# Patient Record
Sex: FEMALE | Race: WHITE | NOT HISPANIC OR LATINO
[De-identification: names, ages, dates, MRNs, and addresses within clinical notes are randomized per-mention and may not be internally consistent; named-entity substitution may affect disease eponyms.]

---

## 2017-09-01 ENCOUNTER — HEALTH MAINTENANCE LETTER (OUTPATIENT)
Age: 47
End: 2017-09-01

## 2017-11-24 ENCOUNTER — HEALTH MAINTENANCE LETTER (OUTPATIENT)
Age: 47
End: 2017-11-24

## 2017-11-24 ENCOUNTER — OFFICE VISIT (OUTPATIENT)
Dept: FAMILY MEDICINE | Facility: CLINIC | Age: 47
End: 2017-11-24

## 2017-11-24 VITALS
HEIGHT: 67 IN | BODY MASS INDEX: 36.35 KG/M2 | HEART RATE: 76 BPM | TEMPERATURE: 97.7 F | OXYGEN SATURATION: 98 % | SYSTOLIC BLOOD PRESSURE: 128 MMHG | DIASTOLIC BLOOD PRESSURE: 82 MMHG | WEIGHT: 231.6 LBS

## 2017-11-24 DIAGNOSIS — Z13.220 SCREENING FOR LIPID DISORDERS: ICD-10-CM

## 2017-11-24 DIAGNOSIS — Z13.228 SCREENING FOR METABOLIC DISORDER: ICD-10-CM

## 2017-11-24 DIAGNOSIS — E66.812 CLASS 2 OBESITY WITHOUT SERIOUS COMORBIDITY WITH BODY MASS INDEX (BMI) OF 36.0 TO 36.9 IN ADULT, UNSPECIFIED OBESITY TYPE: ICD-10-CM

## 2017-11-24 DIAGNOSIS — Z01.419 ENCOUNTER FOR GYNECOLOGICAL EXAMINATION WITHOUT ABNORMAL FINDING: Primary | ICD-10-CM

## 2017-11-24 DIAGNOSIS — Z12.4 SCREENING FOR CERVICAL CANCER: ICD-10-CM

## 2017-11-24 PROCEDURE — 80053 COMPREHEN METABOLIC PANEL: CPT | Mod: 90 | Performed by: PHYSICIAN ASSISTANT

## 2017-11-24 PROCEDURE — 36415 COLL VENOUS BLD VENIPUNCTURE: CPT | Performed by: PHYSICIAN ASSISTANT

## 2017-11-24 PROCEDURE — 99396 PREV VISIT EST AGE 40-64: CPT | Performed by: PHYSICIAN ASSISTANT

## 2017-11-24 PROCEDURE — 80061 LIPID PANEL: CPT | Mod: 90 | Performed by: PHYSICIAN ASSISTANT

## 2017-11-24 PROCEDURE — 87624 HPV HI-RISK TYP POOLED RSLT: CPT | Mod: 90 | Performed by: PHYSICIAN ASSISTANT

## 2017-11-24 PROCEDURE — 88142 CYTOPATH C/V THIN LAYER: CPT | Mod: 90 | Performed by: PHYSICIAN ASSISTANT

## 2017-11-24 NOTE — PROGRESS NOTES
Chief Complaint:  Physical Exam    SUBJECTIVE:   Klarissa Zimmerman is a 47 year old female presents for routine health maintenance.    Current concerns: Concerns about menopause. Had a period 2 weeks apart. This has never happened before. Has also noticed decreasing from 28 days to 26 day    Menses are irregular 2 weeks apart x 1. .     Patient's last menstrual period was 11/09/2017.    Was last Pap smear normal: Yes  Due for mammogram:  Yes    Body mass index is 36.55 kg/(m^2).    Present exercise habits:  No formal exercise. Doesn't have time. A lot of stress with family.   Present dietary habits:  eats foods high in fat and eats regular meals    Calcium intake:  2-3 servings per day. Takes supplement  Vit D intake: is taking supplement    Is the patient a smoker? No  If yes, smoking cessation advised and counseling provided.     Cardiovascular risk factors: obesity    Over the past few weeks, have you felt down or depressed? Little interest or pleasure in doing things? NO    If in a relationship are there any Domestic violence concern: No    Last dental appointment:  this year  Last optical appointment:  this year    Was the patient born between 9[birth year] and has not had Hep C testing?  No, not applicable    I have reviewed the following histories: Past Medical History, Past Surgical History, Social History, Family History, Problem List, Medication List and Allergies    Past Medical History:   Diagnosis Date     Mixed hyperlipidemia      Family History   Problem Relation Age of Onset     Hypertension Mother      Breast Cancer Mother 60     CANCER Mother 73     uterine, carcinosarcoma     Depression Mother      CANCER Father      prostate     Depression Sister      HEART DISEASE Maternal Grandmother      CHF at 90     DIABETES Other      1st cousin     CANCER Paternal Grandfather      prostate     DIABETES Maternal Aunt      Cancer - colorectal No family hx of      Social History     Social History     Marital  status:      Spouse name: Erick     Number of children: 2     Years of education: 16     Occupational History     Teacher      [employer]      [address]     Social History Main Topics     Smoking status: Never Smoker     Smokeless tobacco: Never Used     Alcohol use 0.0 oz/week     0 drink(s) per week      Comment: 1 per month     Drug use: No     Sexual activity: Yes     Partners: Male      Comment: tubal ligation     Other Topics Concern     Exercise Yes     Seat Belt Yes     Self-Exams Yes     Social History Narrative    ABUSE HISTORY:        History of abusive relationships in past:    No    History of abusive relationships currently:    No    Feels safe in home and work envirmt.                   Yes         Past Surgical History:   Procedure Laterality Date     C  DELIVERY ONLY  03    , Low Cervical     C  DELIVERY ONLY      , Low Cervical     C LIGATE FALLOPIAN TUBE,POSTPARTUM      Tubal Ligation     HC LAPAROSCOPY, SURGICAL; CHOLECYSTECTOMY      Cholecystectomy, Laparoscopic       ROS:  E/M: NEGATIVE for ear, nose, mouth and throat problems  R: NEGATIVE for significant/chronic cough or SOB  CV: NEGATIVE for chest pain or palpitations  GI: NEGATIVE for abdominal pain, chronic diarrhea or constipation  :  NEGATIVE for dysuria, hematuria or vaginal discharge. No sexual health concerns.       Current Outpatient Prescriptions   Medication     Omega-3 Fatty Acids (FISH OIL) 1200 MG capsule     MULTI-VITAMIN OR TABS     CALCIUM + D OR     No current facility-administered medications for this visit.        Patient Active Problem List    Diagnosis Date Noted     Health Care Home 10/11/2013     Priority: Medium     State Tier Level:  Tier 1  Status:  n/a  Care Coordinator:  n/a     See Letters for [facility] Care Plan           ACP (advance care planning) 2011     Priority: Medium     Advance Care Planning:   ACP Review and Resources Provided:   "Reviewed chart for advance care plan.  Klarissa Zimmerman has no plan or code status on file. Discussed available resources and provided with information. Confirmed code status reflects current choices pending further ACP discussions.  Confirmed/documented designated decision maker(s). See permanent comments section of demographics in clinical tab.   Added by Melba Taylor on 2/28/2014             Mixed hyperlipidemia      Priority: Medium         OBJECTIVE:  /82 (BP Location: Right arm, Patient Position: Chair, Cuff Size: Adult Large)  Pulse 76  Temp 97.7  F (36.5  C) (Oral)  Ht 1.695 m (5' 6.75\")  Wt 105.1 kg (231 lb 9.6 oz)  LMP 11/09/2017  SpO2 98%  Breastfeeding? No  BMI 36.55 kg/m2        General: 47 year old female who appears her stated age. Vital signs noted  Head: Normocephalic  Eyes: pupils equal round reactive to light and accomodation, extra ocular movements intact  Ears: external canals and TMs free of abnormalities  Nose: patent, without mucosal abnormalities  Mouth and throat: without erythema or lesions of the mucosa  Neck: supple, without adenopathy or thyromegaly  Lungs: clear to auscultation, no wheezing or crackles  Breasts: skin without rash, no dominant mass, no nipple discharge, or axillary adenopathy  Cv: regular rate and rhythm, normal s1 and s2 without murmur or click  Abd: soft, non-tender, no masses, no hepatomegaly or splenomegaly.   (female): normal female external genitalia, normal urethral meatus, vaginal mucosa, normal cervix/adnexa/uterus without masses or discharge  Ms: normal muscle tone & symmetry  Skin: clear to inspection and with no palpable abnormalities.  Neuro: sensation and motor function grossly intact; cranial nerves without obvious abnormalities.    ASSESSMENT/PLAN:    1. Encounter for gynecological examination without abnormal finding  Klarissa is doing well. Did recommend a mammogram. Will check fasting labs today, and contact her via [employer] when " "available. Did spend time discussing weight management, and need for positive exercise, and even some minor diet changes.     2. Screening for cervical cancer  - ThinPrep Pap and HPV mRna E6/E7 ([facility])    3. Screening for lipid disorders  - VENOUS COLLECTION  - Lipid Profile ([facility])    4. Screening for metabolic disorder  - VENOUS COLLECTION  - Comprehensive metabolic panel    5. Class 2 obesity without serious comorbidity with body mass index (BMI) of 36.0 to 36.9 in adult, unspecified obesity type       reports that she has never smoked. She has never used smokeless tobacco.      Estimated body mass index is 36.55 kg/(m^2) as calculated from the following:    Height as of this encounter: 1.695 m (5' 6.75\").    Weight as of this encounter: 105.1 kg (231 lb 9.6 oz).  Weight management plan: Discussed healthy diet and exercise guidelines and patient will follow up in 12 months in clinic to re-evaluate.      Labs pending:      Fasting glucose      Fasting lipids       Pap smear  Meds Suggested:      Vitamin D       Calcium  Tests Recommended:      Regular Dental Examinations        Eye exam        Mammogram yearly  Behavior Modifications:       Cardiovascular exercise 3 times per week--enough to get your Target Heart rate  Other recommendations:     BMI noted and discussed      Regular breast exam     Encouraged My Chart    Counseling Resources:  ATP IV Guidelines  Pooled Cohorts Equation Calculator  Breast Cancer Risk Calculator  FRAX Risk Assessment  ICSI Preventive Guidelines  Dietary Guidelines for Americans, 2010  USDA's MyPlate            Yoselin Andrews PA-C  11/24/2017    "

## 2017-11-24 NOTE — NURSING NOTE
Klarissa is here for a CPX pt is fasting    Patient is here for a full physical exam.    Pre-Visit Screening :  Immunizations : not up to date - postponed flu  Colon Screening : is up to date  Mammogram: up to date  Asthma Action Test/Plan : NA  PHQ9/GAD7 :  phq2      Vitals:  Pulse - regular  My Chart - accepts    CLASSIFICATION OF OVERWEIGHT AND OBESITY BY BMI                         Obesity Class           BMI(kg/m2)  Underweight                                    < 18.5  Normal                                         18.5-24.9  Overweight                                     25.0-29.9  OBESITY                     I                  30.0-34.9                              II                 35.0-39.9  EXTREME OBESITY             III                >40                             Patient's  BMI There is no height or weight on file to calculate BMI.  http://hin.nhlbi.nih.gov/menuplanner/menu.cgi  Questioned patient about current smoking habits.  Pt. has never smoked.    ETOH screening:  Questions:  1-How often do you have a drink containing alcohol?                             2 times per month(s)  2-How many drinks containing alcohol do you have on a typical day when you are         Drinking?                              1   3- How often do you have 5 or more drinks on one occasion?                              0 per month    Have you ever:  None of the patient's responses to the CAGE screening were positive / Negative CAGE score         The patient has verbalized that it is ok to leave a detailed voice message on the patient's home voicemail with results/recommendations from this visit.

## 2017-11-25 LAB
ALBUMIN SERPL-MCNC: 4 G/DL (ref 3.6–5.1)
ALBUMIN/GLOB SERPL: 1.3 (CALC) (ref 1–2.5)
ALP SERPL-CCNC: 90 U/L (ref 33–115)
ALT SERPL-CCNC: 21 U/L (ref 6–29)
AST SERPL-CCNC: 16 U/L (ref 10–35)
BILIRUB SERPL-MCNC: 0.5 MG/DL (ref 0.2–1.2)
BUN SERPL-MCNC: 13 MG/DL (ref 7–25)
BUN/CREATININE RATIO: ABNORMAL (CALC) (ref 6–22)
CALCIUM SERPL-MCNC: 9.1 MG/DL (ref 8.6–10.2)
CHLORIDE SERPLBLD-SCNC: 107 MMOL/L (ref 98–110)
CHOLEST SERPL-MCNC: 238 MG/DL
CHOLEST/HDLC SERPL: 5.2 (CALC)
CO2 SERPL-SCNC: 19 MMOL/L (ref 20–31)
CREAT SERPL-MCNC: 0.6 MG/DL (ref 0.5–1.1)
EGFR AFRICAN AMERICAN - QUEST: 126 ML/MIN/1.73M2
GFR SERPL CREATININE-BSD FRML MDRD: 109 ML/MIN/1.73M2
GLOBULIN, CALCULATED - QUEST: 3 G/DL (CALC) (ref 1.9–3.7)
GLUCOSE - QUEST: 90 MG/DL (ref 65–99)
HDLC SERPL-MCNC: 46 MG/DL
LDLC SERPL CALC-MCNC: 162 MG/DL (CALC)
NONHDLC SERPL-MCNC: 192 MG/DL (CALC)
POTASSIUM SERPL-SCNC: 4.1 MMOL/L (ref 3.5–5.3)
PROT SERPL-MCNC: 7 G/DL (ref 6.1–8.1)
SODIUM SERPL-SCNC: 139 MMOL/L (ref 135–146)
TRIGL SERPL-MCNC: 153 MG/DL

## 2017-11-30 LAB
CLINICAL HISTORY - QUEST: NORMAL
CYTOTECHNOLOGIST - QUEST: NORMAL
DESCRIPTIVE DIAGNOSIS - QUEST: NORMAL
HPV MRNA E6/E7: NOT DETECTED
LAST PAP DX - QUEST: NORMAL
LMP - QUEST: NORMAL
PREV BX DX - QUEST: NORMAL
SOURCE: NORMAL
STATEMENT OF ADEQUACY - QUEST: NORMAL

## 2018-04-05 ENCOUNTER — OFFICE VISIT (OUTPATIENT)
Dept: FAMILY MEDICINE | Facility: CLINIC | Age: 48
End: 2018-04-05

## 2018-04-05 VITALS
DIASTOLIC BLOOD PRESSURE: 75 MMHG | WEIGHT: 234.6 LBS | SYSTOLIC BLOOD PRESSURE: 112 MMHG | OXYGEN SATURATION: 98 % | HEART RATE: 80 BPM | BODY MASS INDEX: 37.02 KG/M2 | TEMPERATURE: 98.3 F

## 2018-04-05 DIAGNOSIS — R03.0 ELEVATED BLOOD PRESSURE READING WITHOUT DIAGNOSIS OF HYPERTENSION: ICD-10-CM

## 2018-04-05 DIAGNOSIS — M72.2 PLANTAR FASCIITIS: Primary | ICD-10-CM

## 2018-04-05 PROCEDURE — 99213 OFFICE O/P EST LOW 20 MIN: CPT | Performed by: PHYSICIAN ASSISTANT

## 2018-04-05 NOTE — NURSING NOTE
Klarissa is here for foot pain X 1 month, both feet pain, mostly left foot however, and pt noticed foot pain began after working out    Pre-Visit Screening :  Immunizations : up to date    Colonoscopy : na  Mammogram : is up to date  Asthma Action Test/Plan : na  PHQ9/GAD7 :  Na    Pulse - regular  My Chart - accepts    CLASSIFICATION OF OVERWEIGHT AND OBESITY BY BMI                         Obesity Class           BMI(kg/m2)  Underweight                                    < 18.5  Normal                                         18.5-24.9  Overweight                                     25.0-29.9  OBESITY                     I                  30.0-34.9                              II                 35.0-39.9  EXTREME OBESITY             III                >40                             Patient's  BMI Body mass index is 22.15 kg/(m^2).  http://hin.nhlbi.nih.gov/menuplanner/menu.cgi  Questioned patient about current smoking habits.  Pt. has never smoked.    The patient has verbalized that it is ok to leave a detailed voice message on the patient's home voicemail with results/recommendations from this visit.       Verified [phone]  phone number:

## 2018-04-05 NOTE — MR AVS SNAPSHOT
After Visit Summary   4/5/2018    Klarissa Zimmerman    MRN: [id number]           Patient Information     Date Of Birth          1970        Visit Information        Provider Department      4/5/2018 5:45 PM Cara Blair PA [facility]        Today's Diagnoses     Plantar fasciitis    -  1    Elevated blood pressure reading without diagnosis of hypertension           Follow-ups after your visit        Who to contact     If you have questions or need follow up information about today's clinic visit or your schedule please contact [facility] directly at [phone].  Normal or non-critical lab and imaging results will be communicated to you by [employer], letter or phone within 4 business days after the clinic has received the results. If you do not hear from us within 7 days, please contact the clinic through [employer] or phone. If you have a critical or abnormal lab result, we will notify you by phone as soon as possible.  Submit refill requests through [employer] or call your pharmacy and they will forward the refill request to us. Please allow 3 business days for your refill to be completed.          Additional Information About Your Visit        MyChart Information     [employer] gives you secure access to your electronic health record. If you see a primary care provider, you can also send messages to your care team and make appointments. If you have questions, please call your primary care clinic.  If you do not have a primary care provider, please call [phone] and they will assist you.        Care EveryWhere ID     This is your Care EveryWhere ID. This could be used by other organizations to access your [facility] medical records  [id number]        Your Vitals Were     Pulse Temperature Pulse Oximetry Breastfeeding? BMI (Body Mass Index)       80 98.3  F (36.8  C) (Oral) 98% No 37.02 kg/m2        Blood Pressure from Last 3 Encounters:    04/05/18 112/75   11/24/17 128/82   10/17/16 124/82    Weight from Last 3 Encounters:   04/05/18 106.4 kg (234 lb 9.6 oz)   11/24/17 105.1 kg (231 lb 9.6 oz)   10/17/16 100.7 kg (222 lb)              Today, you had the following     No orders found for display       Primary Care Provider Office Phone # Fax #    KAYLA Saenz [phone] [address]  [facility] [address]        Equal Access to Services     [facility]: Hadii aad ku hadasho Soomaali, waaxda luqadaha, qaybta kaalmada adeyeceniayada, norma friedman . So [facility] [phone].    ATENCIÓN: Si habla español, tiene a roth disposición servicios gratuitos de asistencia lingüística. Llame al [phone].    We comply with applicable federal civil rights laws and [place] laws. We do not discriminate on the basis of race, color, national origin, age, disability, sex, sexual orientation, or gender identity.            Thank you!     Thank you for choosing [facility]  for your care. Our goal is always to provide you with excellent care. Hearing back from our patients is one way we can continue to improve our services. Please take a few minutes to complete the written survey that you may receive in the mail after your visit with us. Thank you!             Your Updated Medication List - Protect others around you: Learn how to safely use, store and throw away your medicines at www.disposemymeds.org.          This list is accurate as of 4/5/18 11:59 PM.  Always use your most recent med list.                   Brand Name Dispense Instructions for use Diagnosis    CALCIUM + D PO      QD        Multi-vitamin Tabs tablet   Generic drug:  multivitamin, therapeutic with minerals      1 TABLET DAILY        omega-3 fatty acids 1200 MG capsule      Take 1 capsule by mouth daily.

## 2018-11-30 ENCOUNTER — HEALTH MAINTENANCE LETTER (OUTPATIENT)
Age: 48
End: 2018-11-30

## 2018-12-25 NOTE — PROGRESS NOTES
SUBJECTIVE:                                                    Klarissa Zimmerman is a 47 year old female who presents to clinic today for the following health issues:    Patient is here for bilateral foot pain.    Started exercising 1 month ago  Yoga poses/plyo  Was wearing shoes  Pain bilateral feet to step and walking the next day  Left > right  Left leg is shorter than right.     Worse in the am right out of bed.  Better with shoes on  Was wearing crocs - very helpful last weekend    No redness/swelling  No fevers.  No other joint pain              BP Readings from Last 3 Encounters:   18 134/86   17 128/82   10/17/16 124/82    Wt Readings from Last 3 Encounters:   18 106.4 kg (234 lb 9.6 oz)   17 105.1 kg (231 lb 9.6 oz)   10/17/16 100.7 kg (222 lb)            Patient Active Problem List   Diagnosis     Mixed hyperlipidemia     ACP (advance care planning)     Health Care Home     Past Surgical History:   Procedure Laterality Date     C  DELIVERY ONLY  03    , Low Cervical     C  DELIVERY ONLY      , Low Cervical     C LIGATE FALLOPIAN TUBE,POSTPARTUM      Tubal Ligation     HC LAPAROSCOPY, SURGICAL; CHOLECYSTECTOMY      Cholecystectomy, Laparoscopic       Social History   Substance Use Topics     Smoking status: Never Smoker     Smokeless tobacco: Never Used     Alcohol use 0.0 oz/week     0 drink(s) per week      Comment: 1 per month     Family History   Problem Relation Age of Onset     Hypertension Mother      Breast Cancer Mother 60     CANCER Mother 73     uterine, carcinosarcoma     Depression Mother      CANCER Father      prostate     Depression Sister      HEART DISEASE Maternal Grandmother      CHF at 90     DIABETES Other      1st cousin     CANCER Paternal Grandfather      prostate     DIABETES Maternal Aunt      Cancer - colorectal No family hx of          Current Outpatient Prescriptions   Medication Sig Dispense Refill      Omega-3 Fatty Acids (FISH OIL) 1200 MG capsule Take 1 capsule by mouth daily.       MULTI-VITAMIN OR TABS 1 TABLET DAILY       CALCIUM + D OR QD         Allergies   Allergen Reactions     No Known Allergies        OBJECTIVE:                                                    /86 (BP Location: Left arm, Patient Position: Chair, Cuff Size: Adult Large)  Pulse 80  Temp 98.3  F (36.8  C) (Oral)  Wt 106.4 kg (234 lb 9.6 oz)  SpO2 98%  Breastfeeding? No  BMI 37.02 kg/m2 Body mass index is 37.02 kg/(m^2).     Left foot  Inspection:  no swelling, ecchymosis   Tender::calcaneous   Non-tender:proximal 5th metatarsal, midshaft 5th metatarsal, cuboid, navicular, cuneiform lateral, cuneiform middle, cuneiform medial , metatarsal heads  Range of Motion:flexion of toes:  full, extension of toes  full           ASSESSMENT/PLAN:                                                      1. Plantar fasciitis  Exercises from [employer] provided to be completed 1-2 times daily, as tolerated.   Night splints    2. Elevated blood pressure reading without diagnosis of hypertension  The patient has high blood pressure today.  I reviewed what hypertension is with the patient.  Handouts on sodium and hypertension provided.    I have advised lifestyle changes including DASH diet, Sodium restriction, smoking cessation (if needed), moderate exercise (goal 150 min per week).    She will Return to the clinic in 6-8 weeks to recheck BP after lifestyle modification.  If BP remains high, will discuss medication management.             Cara Blair PA-C  [facility]     ambulatory

## 2019-02-28 NOTE — PROGRESS NOTES
SUBJECTIVE:   Klarissa Zimmerman is a 48 year old female who presents to clinic today for the following health issues:      Hyperlipidemia Follow-Up - not on medication      Rate your low fat/cholesterol diet?: good    Taking statin?  No    Other lipid medications/supplements?:  none    The 10-year ASCVD risk score (Pat GREGORY Jr., et al., 2013) is: 1.9%    Values used to calculate the score:      Age: 48 years      Sex: Female      Is Non- : No      Diabetic: No      Tobacco smoker: No      Systolic Blood Pressure: 136 mmHg      Is BP treated: No      HDL Cholesterol: 46 mg/dL      Total Cholesterol: 238 mg/dL      BP Readings from Last 6 Encounters:   19 136/82   18 112/75   17 128/82   10/17/16 124/82   16 110/72   14 120/66           Amount of exercise or physical activity: None    Problems taking medications regularly: No    Medication side effects: none    Diet: regular (no restrictions)      Wt Readings from Last 5 Encounters:   19 106.5 kg (234 lb 12.8 oz)   18 106.4 kg (234 lb 9.6 oz)   17 105.1 kg (231 lb 9.6 oz)   10/17/16 100.7 kg (222 lb)   16 95.8 kg (211 lb 4.8 oz)           BPs have been high in the past  BP Readings from Last 6 Encounters:   19 136/82   18 112/75   17 128/82   10/17/16 124/82   16 110/72   14 120/66       -------------------------------------    Problem list and histories reviewed & adjusted, as indicated.  Additional history: as documented    Patient Active Problem List   Diagnosis     Mixed hyperlipidemia     ACP (advance care planning)     Health Care Home     Elevated blood pressure reading without diagnosis of hypertension     Past Surgical History:   Procedure Laterality Date     C  DELIVERY ONLY  03    , Low Cervical     C  DELIVERY ONLY      , Low Cervical     C LIGATE FALLOPIAN TUBE,POSTPARTUM      Tubal Ligation     HC  LAPAROSCOPY, SURGICAL; CHOLECYSTECTOMY  7/03    Cholecystectomy, Laparoscopic       Social History     Tobacco Use     Smoking status: Never Smoker     Smokeless tobacco: Never Used   Substance Use Topics     Alcohol use: Yes     Alcohol/week: 0.0 oz     Comment: 1 per month     Family History   Problem Relation Age of Onset     Hypertension Mother      Breast Cancer Mother 60     Cancer Mother 73        uterine, carcinosarcoma     Depression Mother      Prostate Cancer Father         prostate     Depression Sister      Hypertension Sister      Hyperlipidemia Sister      Heart Disease Maternal Grandmother         CHF at 90     Diabetes Other         1st cousin     Cancer Paternal Grandfather         prostate     Diabetes Maternal Aunt      Hyperlipidemia Maternal Aunt      Cancer - colorectal No family hx of          Current Outpatient Medications   Medication Sig Dispense Refill     CALCIUM + D OR QD       MULTI-VITAMIN OR TABS 1 TABLET DAILY       Omega-3 Fatty Acids (FISH OIL) 1200 MG capsule Take 1 capsule by mouth daily.       Allergies   Allergen Reactions     No Known Allergies      Recent Labs   Lab Test 11/24/17  1005 10/17/16  1056 02/28/14  0937 11/23/11   * 145* 159* 126   HDL 46* 48 58 46   TRIG 153* 121 57 96   ALT 21  --  19 18   CR 0.60  --  0.66 0.68   GFRESTIMATED 109  --  108 109   POTASSIUM 4.1  --  3.9 4.1      BP Readings from Last 3 Encounters:   03/01/19 136/82   04/05/18 112/75   11/24/17 128/82    Wt Readings from Last 3 Encounters:   03/01/19 106.5 kg (234 lb 12.8 oz)   04/05/18 106.4 kg (234 lb 9.6 oz)   11/24/17 105.1 kg (231 lb 9.6 oz)                  Labs reviewed in EPIC    Reviewed and updated as needed this visit by clinical staff  Tobacco  Allergies  Meds  Problems  Med Hx  Surg Hx       Reviewed and updated as needed this visit by Provider         ROS:  Constitutional, HEENT, cardiovascular, pulmonary, gi and gu systems are negative, except as otherwise  "noted.    OBJECTIVE:     /82 (BP Location: Left arm, Patient Position: Chair, Cuff Size: Adult Large)   Pulse 84   Temp 98.7  F (37.1  C) (Oral)   Resp 20   Ht 1.683 m (5' 6.25\")   Wt 106.5 kg (234 lb 12.8 oz)   LMP 02/11/2019   BMI 37.61 kg/m    Body mass index is 37.61 kg/m .     GENERAL: healthy, alert and no distress  EYES: Eyes grossly normal to inspection, PERRL and conjunctivae and sclerae normal  HENT: ear canals and TM's normal, nose and mouth without ulcers or lesions  NECK: no adenopathy, no asymmetry, masses, or scars and thyroid normal to palpation  RESP: lungs clear to auscultation - no rales, rhonchi or wheezes  CV: regular rate and rhythm, normal S1 S2, no S3 or S4, no murmur, click or rub, no peripheral edema and peripheral pulses strong  MS: no gross musculoskeletal defects noted, no edema  SKIN: no suspicious lesions or rashes  NEURO: Normal strength and tone, mentation intact and speech normal  PSYCH: mentation appears normal, affect normal/bright        ASSESSMENT/PLAN:         BMI:   Estimated body mass index is 37.61 kg/m  as calculated from the following:    Height as of this encounter: 1.683 m (5' 6.25\").    Weight as of this encounter: 106.5 kg (234 lb 12.8 oz).   Weight management plan: Specific weight management program called [facility] discussed    Declined immunizations: Influenza due to Conscientious objector    (E78.2) Mixed hyperlipidemia  (primary encounter diagnosis)  Comment: recheck today  Plan: Lipid Profile, VENOUS COLLECTION    ADDENDUM: STARTED LIPITOR  RTC 6 WEEKS LAB ONLY  I reviewed the patient information handout from Up To Date on this medication including side effects with the patient.              (R03.0) Elevated blood pressure reading without diagnosis of hypertension  Comment: new  Plan: Handout provided on HTN    (E66.9) Obesity (BMI 30-39.9)  Comment: weight increasing    FUTURE APPOINTMENTS:       - Follow-up visit in 12 months for recheck BP if chol normal " for fasting CPE    KAYLA Saenz  [facility]

## 2019-03-01 ENCOUNTER — OFFICE VISIT (OUTPATIENT)
Dept: FAMILY MEDICINE | Facility: CLINIC | Age: 49
End: 2019-03-01

## 2019-03-01 ENCOUNTER — TRANSFERRED RECORDS (OUTPATIENT)
Dept: FAMILY MEDICINE | Facility: CLINIC | Age: 49
End: 2019-03-01

## 2019-03-01 VITALS
HEIGHT: 66 IN | TEMPERATURE: 98.7 F | RESPIRATION RATE: 20 BRPM | BODY MASS INDEX: 37.73 KG/M2 | WEIGHT: 234.8 LBS | DIASTOLIC BLOOD PRESSURE: 82 MMHG | SYSTOLIC BLOOD PRESSURE: 136 MMHG | HEART RATE: 84 BPM

## 2019-03-01 DIAGNOSIS — E78.2 MIXED HYPERLIPIDEMIA: Primary | ICD-10-CM

## 2019-03-01 DIAGNOSIS — R03.0 ELEVATED BLOOD PRESSURE READING WITHOUT DIAGNOSIS OF HYPERTENSION: ICD-10-CM

## 2019-03-01 DIAGNOSIS — E66.9 OBESITY (BMI 30-39.9): ICD-10-CM

## 2019-03-01 LAB — MAMMOGRAM: NORMAL

## 2019-03-01 PROCEDURE — 36415 COLL VENOUS BLD VENIPUNCTURE: CPT | Performed by: PHYSICIAN ASSISTANT

## 2019-03-01 PROCEDURE — 80061 LIPID PANEL: CPT | Mod: 90 | Performed by: PHYSICIAN ASSISTANT

## 2019-03-01 PROCEDURE — 99213 OFFICE O/P EST LOW 20 MIN: CPT | Performed by: PHYSICIAN ASSISTANT

## 2019-03-01 ASSESSMENT — MIFFLIN-ST. JEOR: SCORE: 1715.77

## 2019-03-01 NOTE — NURSING NOTE
Questioned patient about current smoking habits.  Pt. has never smoked.  PULSE regular  My Chart: active  CLASSIFICATION OF OVERWEIGHT AND OBESITY BY BMI                        Obesity Class           BMI(kg/m2)  Underweight                                    < 18.5  Normal                                         18.5-24.9  Overweight                                     25.0-29.9  OBESITY                     I                  30.0-34.9                             II                 35.0-39.9  EXTREME OBESITY             III                >40                            Patient's  BMI Body mass index is 37.61 kg/m .  http://hin.nhlbi.nih.gov/menuplanner/menu.cgi  Pre-visit planning  Immunizations - up to date  Colonoscopy -   Mammogram -   Asthma -   PHQ9 -    IVIS-7 -    The patient has verbalized that it is ok to leave a detailed voice message on the patient's cell phone with results/recommendations from this visit.

## 2019-03-02 LAB
CHOLEST SERPL-MCNC: 267 MG/DL
CHOLEST/HDLC SERPL: 5 (CALC)
HDLC SERPL-MCNC: 53 MG/DL
LDLC SERPL CALC-MCNC: 192 MG/DL (CALC)
NONHDLC SERPL-MCNC: 214 MG/DL (CALC)
TRIGL SERPL-MCNC: 103 MG/DL

## 2019-03-04 ENCOUNTER — TELEPHONE (OUTPATIENT)
Dept: FAMILY MEDICINE | Facility: CLINIC | Age: 49
End: 2019-03-04

## 2019-03-04 DIAGNOSIS — E78.00 PURE HYPERCHOLESTEROLEMIA: Primary | ICD-10-CM

## 2019-03-04 NOTE — TELEPHONE ENCOUNTER
Patient called back in and message from Cara was given to her. She expressed understanding to this and will start the medication and come back in 6 weeks for a fasting lab check.

## 2019-03-04 NOTE — TELEPHONE ENCOUNTER
LMTCB    LDL is 192 and it would be recommended she start a cholesterol lowering medication like Lipitor.   I would recommend 20 mg once daily    AE include:     Diarrhea.    Joint pain.    Upset stomach.    Nose and throat irritation.    Not able to sleep.      She would need to RTC for lab only appt in 6 weeks.    If she calls back - ask if she would like to start and if so I will send in rx - return for lab only in 6 weeks.    If she doesn't want to start medication -have her see me 3 months in clinic fasting to recheck.

## 2019-03-05 PROBLEM — E78.00 PURE HYPERCHOLESTEROLEMIA: Status: ACTIVE | Noted: 2019-03-05

## 2019-03-05 RX ORDER — ATORVASTATIN CALCIUM 20 MG/1
20 TABLET, FILM COATED ORAL DAILY
Qty: 90 TABLET | Refills: 0 | Status: SHIPPED | OUTPATIENT
Start: 2019-03-05 | End: 2019-05-20

## 2019-04-09 ENCOUNTER — OFFICE VISIT (OUTPATIENT)
Dept: FAMILY MEDICINE | Facility: CLINIC | Age: 49
End: 2019-04-09

## 2019-04-09 VITALS
RESPIRATION RATE: 16 BRPM | DIASTOLIC BLOOD PRESSURE: 90 MMHG | TEMPERATURE: 97.7 F | OXYGEN SATURATION: 97 % | HEIGHT: 66 IN | WEIGHT: 236.2 LBS | SYSTOLIC BLOOD PRESSURE: 140 MMHG | HEART RATE: 77 BPM | BODY MASS INDEX: 37.96 KG/M2

## 2019-04-09 DIAGNOSIS — E66.01 MORBID OBESITY (H): ICD-10-CM

## 2019-04-09 DIAGNOSIS — B02.9 HERPES ZOSTER WITHOUT COMPLICATION: Primary | ICD-10-CM

## 2019-04-09 PROCEDURE — 99213 OFFICE O/P EST LOW 20 MIN: CPT | Performed by: FAMILY MEDICINE

## 2019-04-09 RX ORDER — VALACYCLOVIR HYDROCHLORIDE 1 G/1
1000 TABLET, FILM COATED ORAL 3 TIMES DAILY
Qty: 21 TABLET | Refills: 0 | Status: SHIPPED | OUTPATIENT
Start: 2019-04-09 | End: 2020-05-27

## 2019-04-09 ASSESSMENT — MIFFLIN-ST. JEOR: SCORE: 1718.15

## 2019-04-09 NOTE — LETTER
April 9, 2019      Klarissa Zimmerman  [id number] [facility] [id number]        To Whom It May Concern:    Klarissa Zimmerman  was seen on 4/9/2019.  Please excuse her  until 4/15/2019 due to illness.        Sincerely,        Deonna Shea MD

## 2019-04-09 NOTE — PATIENT INSTRUCTIONS
Herpes zoster without complication  (primary encounter diagnosis)  Comment: reviewed handout, etiology, skin care, pain control  Plan: valACYclovir (VALTREX) 1000 mg tablet        Potential medication side effects were discussed with the patient; let me know if any occur.  Monitor for any worrisome symptoms

## 2019-04-09 NOTE — NURSING NOTE
Klarissa is here for rash on face x 2.5 days    Pre-visit Screening:  Immunizations:  up to date  Colonoscopy:  NA  Mammogram: is up to date  Asthma Action Test/Plan:  NA  PHQ9:  None  GAD7:  None  Questioned patient about current smoking habits Pt. has never smoked.  Ok to leave detailed message on voice mail for today's visit only Yes, phone # [phone]

## 2019-04-09 NOTE — PROGRESS NOTES
RASH    Location:Left side of neck pain noted 4 days ago, spreading painful and itching rash along the left chin and neck noticed 2 days later    When:4-2 days    any causative agent ?:none    any other chronic skin diseases?: none    Description: Erythematous patches with clusters of vesicles over the left occipital scalp, left neck line and lower jaw. External ears  and canals clear bilaterally. TM's normal bilaterally. Nose normal without lesions or discharge. Oropharynx normal. Neck supple without palpable adenopathy.  Regular rate and  rhythm. S1 and S2 normal, no murmurs, clicks, gallops or rubs. No edema or JVD. Chest is clear; no wheezes or rales.      pt use of Meds:none    Diagnosis:(B02.9) Herpes zoster without complication  (primary encounter diagnosis)  Comment: reviewed handout, etiology, skin care, pain control  Plan: valACYclovir (VALTREX) 1000 mg tablet        Potential medication side effects were discussed with the patient; let me know if any occur.  Monitor for any worrisome symptoms    (E66.01) Morbid obesity (H)  Plan: I have reviewed the patient's medical history in detail and updated the computerized patient record.          plan for follow up: 7-10 days

## 2019-04-18 NOTE — PROGRESS NOTES
SUBJECTIVE:   Klarissa Zimmerman is a 48 year old female who presents to clinic today for the following   health issues:    Dg with Shingles on   Started on Valtrex  Rash improving - all crusted over   no pain    Standing orders for Cholesterol and ALT.           Reviewed  and updated as needed this visit by clinical staff  Tobacco  Allergies  Meds  Problems         Reviewed and updated as needed this visit by Provider         Patient Active Problem List   Diagnosis     Mixed hyperlipidemia     ACP (advance care planning)     Health Care Home     Elevated blood pressure reading without diagnosis of hypertension     Pure hypercholesterolemia     Obesity (BMI 35.0-39.9) with comorbidity (H)     Past Surgical History:   Procedure Laterality Date     C  DELIVERY ONLY  03    , Low Cervical     C  DELIVERY ONLY      , Low Cervical     C LIGATE FALLOPIAN TUBE,POSTPARTUM      Tubal Ligation     HC LAPAROSCOPY, SURGICAL; CHOLECYSTECTOMY      Cholecystectomy, Laparoscopic       Social History     Tobacco Use     Smoking status: Never Smoker     Smokeless tobacco: Never Used   Substance Use Topics     Alcohol use: Yes     Alcohol/week: 0.0 oz     Comment: 1 per month     Family History   Problem Relation Age of Onset     Hypertension Mother      Breast Cancer Mother 60     Cancer Mother 73        uterine, carcinosarcoma     Depression Mother      Prostate Cancer Father         prostate     Depression Sister      Hypertension Sister      Hyperlipidemia Sister      Heart Disease Maternal Grandmother         CHF at 90     Diabetes Other         1st cousin     Cancer Paternal Grandfather         prostate     Diabetes Maternal Aunt      Hyperlipidemia Maternal Aunt      Cancer - colorectal No family hx of          Current Outpatient Medications   Medication Sig Dispense Refill     atorvastatin (LIPITOR) 20 MG tablet Take 1 tablet (20 mg) by mouth daily 90 tablet 0     CALCIUM  + D OR QD       cetirizine (ZYRTEC) 5 MG tablet Take 5 mg by mouth daily       MULTI-VITAMIN OR TABS 1 TABLET DAILY       Omega-3 Fatty Acids (FISH OIL) 1200 MG capsule Take 1 capsule by mouth daily.       valACYclovir (VALTREX) 1000 mg tablet Take 1 tablet (1,000 mg) by mouth 3 times daily for 7 days 21 tablet 0     Allergies   Allergen Reactions     No Known Allergies      BP Readings from Last 3 Encounters:   04/19/19 128/80   04/09/19 140/90   03/01/19 136/82    Wt Readings from Last 3 Encounters:   04/19/19 105.7 kg (233 lb)   04/09/19 107.1 kg (236 lb 3.2 oz)   03/01/19 106.5 kg (234 lb 12.8 oz)                    ROS:  Constitutional, HEENT, cardiovascular, pulmonary, gi and gu systems are negative, except as otherwise noted.    OBJECTIVE:     /80 (BP Location: Left arm, Patient Position: Sitting, Cuff Size: Adult Large)   Pulse 79   Temp 98.1  F (36.7  C) (Oral)   Wt 105.7 kg (233 lb)   LMP 04/05/2019   SpO2 98%   BMI 37.61 kg/m    Body mass index is 37.61 kg/m .    Left neck , left upper chest - faint erythematous papules.         ASSESSMENT/PLAN:         (B02.9) Herpes zoster without complication  (primary encounter diagnosis)  Comment: improving  Plan:   Shingrix at 50 advised  RTC with any new concerns        KAYLA Saenz  [facility]

## 2019-04-19 ENCOUNTER — OFFICE VISIT (OUTPATIENT)
Dept: FAMILY MEDICINE | Facility: CLINIC | Age: 49
End: 2019-04-19

## 2019-04-19 VITALS
DIASTOLIC BLOOD PRESSURE: 80 MMHG | WEIGHT: 233 LBS | TEMPERATURE: 98.1 F | HEART RATE: 79 BPM | OXYGEN SATURATION: 98 % | SYSTOLIC BLOOD PRESSURE: 128 MMHG | BODY MASS INDEX: 37.61 KG/M2

## 2019-04-19 DIAGNOSIS — B02.9 HERPES ZOSTER WITHOUT COMPLICATION: Primary | ICD-10-CM

## 2019-04-19 DIAGNOSIS — E78.2 MIXED HYPERLIPIDEMIA: ICD-10-CM

## 2019-04-19 PROCEDURE — 36415 COLL VENOUS BLD VENIPUNCTURE: CPT | Performed by: PHYSICIAN ASSISTANT

## 2019-04-19 PROCEDURE — 84460 ALANINE AMINO (ALT) (SGPT): CPT | Mod: 90 | Performed by: PHYSICIAN ASSISTANT

## 2019-04-19 PROCEDURE — 80061 LIPID PANEL: CPT | Mod: 90 | Performed by: PHYSICIAN ASSISTANT

## 2019-04-19 PROCEDURE — 99212 OFFICE O/P EST SF 10 MIN: CPT | Performed by: PHYSICIAN ASSISTANT

## 2019-04-19 RX ORDER — CETIRIZINE HYDROCHLORIDE 5 MG/1
5 TABLET ORAL DAILY
COMMUNITY

## 2019-04-19 NOTE — NURSING NOTE
Patient is here for follow up today on her shingles rash that is now getting better and to do labs to see how atorvastatin medication is working for her cholesterol.    Pre-visit Screening:  Immunizations:  up to date  Colonoscopy:  is up to date  Mammogram: is up to date  Asthma Action Test/Plan:  EVERT  PHQ9:  NA  GAD7:  NA  Questioned patient about current smoking habits Pt. has never smoked.  Ok to leave detailed message on voice mail for today's visit only Yes, phone # [phone]

## 2019-04-20 LAB
ALT SERPL-CCNC: 28 U/L (ref 6–29)
CHOLEST SERPL-MCNC: 178 MG/DL
CHOLEST/HDLC SERPL: 3.8 (CALC)
HDLC SERPL-MCNC: 47 MG/DL
LDLC SERPL CALC-MCNC: 106 MG/DL (CALC)
NONHDLC SERPL-MCNC: 131 MG/DL (CALC)
TRIGL SERPL-MCNC: 141 MG/DL

## 2019-05-20 DIAGNOSIS — E78.00 PURE HYPERCHOLESTEROLEMIA: ICD-10-CM

## 2019-05-20 NOTE — TELEPHONE ENCOUNTER
Pending Prescriptions:                       Disp   Refills    atorvastatin (LIPITOR) 20 MG tablet [Phar*90 tab*             Sig: TAKE 1 TABLET BY MOUTH EVERY DAY    Last refill was 3-2019  Last lipid was 4-2019  Change qty and fax and close encounter  Maggie  [phone] (home) [phone] (work)

## 2019-05-21 RX ORDER — ATORVASTATIN CALCIUM 20 MG/1
TABLET, FILM COATED ORAL
Qty: 90 TABLET | Refills: 3 | Status: SHIPPED | OUTPATIENT
Start: 2019-05-21 | End: 2020-05-18

## 2019-09-27 ENCOUNTER — HEALTH MAINTENANCE LETTER (OUTPATIENT)
Age: 49
End: 2019-09-27

## 2020-05-18 ENCOUNTER — TELEPHONE (OUTPATIENT)
Dept: FAMILY MEDICINE | Facility: CLINIC | Age: 50
End: 2020-05-18

## 2020-05-18 DIAGNOSIS — E78.00 PURE HYPERCHOLESTEROLEMIA: ICD-10-CM

## 2020-05-18 RX ORDER — ATORVASTATIN CALCIUM 20 MG/1
20 TABLET, FILM COATED ORAL DAILY
Qty: 10 TABLET | Refills: 0 | Status: SHIPPED | OUTPATIENT
Start: 2020-05-18 | End: 2020-05-27

## 2020-05-18 NOTE — TELEPHONE ENCOUNTER
Denied refill request for Atorvastatin 20MG to [facility]. Pt due for FASTING yearly OV.

## 2020-05-18 NOTE — TELEPHONE ENCOUNTER
Klarissa Zimmerman is requesting a refill of:    Pending Prescriptions:                       Disp   Refills    atorvastatin (LIPITOR) 20 MG tablet       10 tab*0            Sig: Take 1 tablet (20 mg) by mouth daily    Pt has OV on 5/27/20

## 2020-05-27 ENCOUNTER — OFFICE VISIT (OUTPATIENT)
Dept: FAMILY MEDICINE | Facility: CLINIC | Age: 50
End: 2020-05-27

## 2020-05-27 VITALS
SYSTOLIC BLOOD PRESSURE: 136 MMHG | HEART RATE: 68 BPM | WEIGHT: 209.36 LBS | RESPIRATION RATE: 20 BRPM | HEIGHT: 66 IN | TEMPERATURE: 97.3 F | BODY MASS INDEX: 33.65 KG/M2 | DIASTOLIC BLOOD PRESSURE: 74 MMHG

## 2020-05-27 DIAGNOSIS — E78.00 PURE HYPERCHOLESTEROLEMIA: ICD-10-CM

## 2020-05-27 LAB
ALBUMIN SERPL-MCNC: 4.4 G/DL (ref 3.6–5.1)
ALBUMIN/GLOB SERPL: 1.6 {RATIO} (ref 1–2.5)
ALP SERPL-CCNC: 92 U/L (ref 33–130)
ALT 1742-6: 8 U/L (ref 0–32)
AST 1920-8: 8 U/L (ref 0–35)
BILIRUB SERPL-MCNC: 0.7 MG/DL (ref 0.2–1.2)
BUN SERPL-MCNC: 11 MG/DL (ref 7–25)
BUN/CREATININE RATIO: 15.1 (ref 6–22)
CALCIUM SERPL-MCNC: 9 MG/DL (ref 8.6–10.3)
CHLORIDE SERPLBLD-SCNC: 107.1 MMOL/L (ref 98–110)
CHOLEST SERPL-MCNC: 174 MG/DL (ref 0–199)
CHOLEST/HDLC SERPL: 3 {RATIO} (ref 0–5)
CO2 SERPL-SCNC: 25.8 MMOL/L (ref 20–32)
CREAT SERPL-MCNC: 0.73 MG/DL (ref 0.7–1.18)
GLOBULIN, CALCULATED - QUEST: 2.8 (ref 1.9–3.7)
GLUCOSE SERPL-MCNC: 89 MG/DL (ref 60–99)
HDLC SERPL-MCNC: 55 MG/DL (ref 40–150)
LDLC SERPL CALC-MCNC: 100 MG/DL (ref 0–130)
POTASSIUM SERPL-SCNC: 4.22 MMOL/L (ref 3.5–5.3)
PROT SERPL-MCNC: 7.2 G/DL (ref 6.1–8.1)
SODIUM SERPL-SCNC: 141.4 MMOL/L (ref 135–146)
TRIGL SERPL-MCNC: 94 MG/DL (ref 0–149)

## 2020-05-27 PROCEDURE — 99213 OFFICE O/P EST LOW 20 MIN: CPT | Performed by: FAMILY MEDICINE

## 2020-05-27 PROCEDURE — 80053 COMPREHEN METABOLIC PANEL: CPT | Performed by: FAMILY MEDICINE

## 2020-05-27 PROCEDURE — 36415 COLL VENOUS BLD VENIPUNCTURE: CPT | Performed by: FAMILY MEDICINE

## 2020-05-27 PROCEDURE — 80061 LIPID PANEL: CPT | Performed by: FAMILY MEDICINE

## 2020-05-27 RX ORDER — ATORVASTATIN CALCIUM 20 MG/1
20 TABLET, FILM COATED ORAL DAILY
Qty: 90 TABLET | Refills: 3 | Status: SHIPPED | OUTPATIENT
Start: 2020-05-27 | End: 2021-06-21

## 2020-05-27 ASSESSMENT — MIFFLIN-ST. JEOR: SCORE: 1591.4

## 2020-05-27 NOTE — NURSING NOTE
Klarissa Zimmerman is here for fasting blood work and medication refill.  Questioned patient about current smoking habits.  Pt. has never smoked.  Body mass index is 33.67 kg/(m^2).  PULSE regular  My Chart: active    Pre-visit planning  Immunizations - up to date  Colonoscopy -   Mammogram -   Asthma -   PHQ9  IVIS-7

## 2020-05-27 NOTE — PROGRESS NOTES
Subjective     Klarissa Zimmerman is a 49 year old female who presents to clinic today for the following health issues:    HPI   Hyperlipidemia Follow-Up      Are you regularly taking any medication or supplement to lower your cholesterol?   Yes- statin    Are you having muscle aches or other side effects that you think could be caused by your cholesterol lowering medication?  No      How many servings of fruits and vegetables do you eat daily?  2-3    On average, how many sweetened beverages do you drink each day (Examples: soda, juice, sweet tea, etc.  Do NOT count diet or artificially sweetened beverages)?   1    How many days per week do you exercise enough to make your heart beat faster? 3 or less    How many minutes a day do you exercise enough to make your heart beat faster? 10 - 19    How many days per week do you miss taking your medication? 0        Patient Active Problem List   Diagnosis     Mixed hyperlipidemia     ACP (advance care planning)     Health Care Home     Elevated blood pressure reading without diagnosis of hypertension     Pure hypercholesterolemia     Obesity (BMI 35.0-39.9) with comorbidity (H)     Past Surgical History:   Procedure Laterality Date     C  DELIVERY ONLY  03    , Low Cervical     C  DELIVERY ONLY      , Low Cervical     C LIGATE FALLOPIAN TUBE,POSTPARTUM      Tubal Ligation     HC LAPAROSCOPY, SURGICAL; CHOLECYSTECTOMY      Cholecystectomy, Laparoscopic       Social History     Tobacco Use     Smoking status: Never Smoker     Smokeless tobacco: Never Used   Substance Use Topics     Alcohol use: Yes     Alcohol/week: 0.0 standard drinks     Comment: 1 per month     Family History   Problem Relation Age of Onset     Hypertension Mother      Breast Cancer Mother 60     Cancer Mother 73        uterine, carcinosarcoma     Depression Mother      Prostate Cancer Father         prostate     Depression Sister      Hypertension Sister   "    Hyperlipidemia Sister      Heart Disease Maternal Grandmother         CHF at 90     Diabetes Other         1st cousin     Cancer Paternal Grandfather         prostate     Diabetes Maternal Aunt      Hyperlipidemia Maternal Aunt      Cancer - colorectal No family hx of          Current Outpatient Medications   Medication Sig Dispense Refill     atorvastatin (LIPITOR) 20 MG tablet Take 1 tablet (20 mg) by mouth daily 10 tablet 0     CALCIUM + D OR QD       cetirizine (ZYRTEC) 5 MG tablet Take 5 mg by mouth daily       MULTI-VITAMIN OR TABS 1 TABLET DAILY       Allergies   Allergen Reactions     No Known Allergies      Recent Labs   Lab Test 04/19/19  0953 03/01/19  0943 11/24/17  1005  02/28/14  0937   * 192* 162*   < > 159*   HDL 47* 53 46*   < > 58   TRIG 141 103 153*   < > 57   ALT 28  --  21  --  19   CR  --   --  0.60  --  0.66   GFRESTIMATED  --   --  109  --  108   POTASSIUM  --   --  4.1  --  3.9    < > = values in this interval not displayed.      BP Readings from Last 3 Encounters:   05/27/20 136/74   04/19/19 128/80   04/09/19 140/90    Wt Readings from Last 3 Encounters:   05/27/20 95 kg (209 lb 5.8 oz)   04/19/19 105.7 kg (233 lb)   04/09/19 107.1 kg (236 lb 3.2 oz)                      Reviewed and updated as needed this visit by Provider         Review of Systems   Constitutional, HEENT, cardiovascular, pulmonary, gi and gu systems are negative, except as otherwise noted.      Objective    /74 (BP Location: Right arm, Patient Position: Chair, Cuff Size: Adult Large)   Pulse 68   Temp 97.3  F (36.3  C)   Resp 20   Ht 1.676 m (5' 6\")   Wt 95 kg (209 lb 5.8 oz)   BMI 33.79 kg/m    Body mass index is 33.79 kg/m .  Physical Exam   GENERAL: healthy, alert and no distress  NECK: no adenopathy, no asymmetry, masses, or scars and thyroid normal to palpation  RESP: lungs clear to auscultation - no rales, rhonchi or wheezes  CV: regular rate and rhythm, normal S1 S2, no S3 or S4, no murmur, " "click or rub, no peripheral edema and peripheral pulses strong  ABDOMEN: soft, nontender, no hepatosplenomegaly, no masses and bowel sounds normal  MS: no gross musculoskeletal defects noted, no edema    Diagnostic Test Results:  Labs reviewed in Epic        Assessment & Plan   Assessment      Plan  (E78.00) Pure hypercholesterolemia  Comment: control uncertain, has lost weight through intermittent fasting  Plan: atorvastatin (LIPITOR) 20 MG tablet, Lipid         Panel (BFP), Comprehensive Metobolic Panel         (BFP), VENOUS COLLECTION        continue current medications at current doses pending labs      BMI:   Estimated body mass index is 33.79 kg/m  as calculated from the following:    Height as of this encounter: 1.676 m (5' 6\").    Weight as of this encounter: 95 kg (209 lb 5.8 oz).   Weight management plan: Discussed healthy diet and exercise guidelines        FUTURE APPOINTMENTS:       - Follow-up visit in 1 yr  Work on weight loss  Regular exercise    No follow-ups on file.    Elton Chavez MD  [facility]            "

## 2020-05-28 ENCOUNTER — MYC MEDICAL ADVICE (OUTPATIENT)
Dept: FAMILY MEDICINE | Facility: CLINIC | Age: 50
End: 2020-05-28

## 2021-01-09 ENCOUNTER — HEALTH MAINTENANCE LETTER (OUTPATIENT)
Age: 51
End: 2021-01-09

## 2021-05-31 DIAGNOSIS — E78.00 PURE HYPERCHOLESTEROLEMIA: ICD-10-CM

## 2021-06-01 RX ORDER — ATORVASTATIN CALCIUM 20 MG/1
20 TABLET, FILM COATED ORAL DAILY
Qty: 90 TABLET | Refills: 0 | COMMUNITY
Start: 2021-06-01

## 2021-06-21 ENCOUNTER — OFFICE VISIT (OUTPATIENT)
Dept: FAMILY MEDICINE | Facility: CLINIC | Age: 51
End: 2021-06-21

## 2021-06-21 VITALS
DIASTOLIC BLOOD PRESSURE: 76 MMHG | SYSTOLIC BLOOD PRESSURE: 124 MMHG | HEART RATE: 70 BPM | WEIGHT: 211.8 LBS | BODY MASS INDEX: 33.24 KG/M2 | OXYGEN SATURATION: 98 % | HEIGHT: 67 IN | TEMPERATURE: 97.9 F

## 2021-06-21 DIAGNOSIS — E66.811 OBESITY (BMI 30.0-34.9): ICD-10-CM

## 2021-06-21 DIAGNOSIS — E78.2 MIXED HYPERLIPIDEMIA: Primary | ICD-10-CM

## 2021-06-21 DIAGNOSIS — Z12.31 ENCOUNTER FOR SCREENING MAMMOGRAM FOR BREAST CANCER: ICD-10-CM

## 2021-06-21 DIAGNOSIS — M17.12 PRIMARY OSTEOARTHRITIS OF LEFT KNEE: ICD-10-CM

## 2021-06-21 DIAGNOSIS — Z12.11 SPECIAL SCREENING FOR MALIGNANT NEOPLASMS, COLON: ICD-10-CM

## 2021-06-21 PROBLEM — R03.0 ELEVATED BLOOD PRESSURE READING WITHOUT DIAGNOSIS OF HYPERTENSION: Status: RESOLVED | Noted: 2018-04-05 | Resolved: 2021-06-21

## 2021-06-21 PROBLEM — E78.00 PURE HYPERCHOLESTEROLEMIA: Status: RESOLVED | Noted: 2019-03-05 | Resolved: 2021-06-21

## 2021-06-21 LAB
ALBUMIN SERPL-MCNC: 4 G/DL (ref 3.6–5.1)
ALBUMIN/GLOB SERPL: 1.5 {RATIO} (ref 1–2.5)
ALP SERPL-CCNC: 79 U/L (ref 33–130)
ALT 1742-6: 17 U/L (ref 0–32)
AST 1920-8: 10 U/L (ref 0–35)
BILIRUB SERPL-MCNC: 0.7 MG/DL (ref 0.2–1.2)
BUN SERPL-MCNC: 12 MG/DL (ref 7–25)
BUN/CREATININE RATIO: 17.1 (ref 6–22)
CALCIUM SERPL-MCNC: 9 MG/DL (ref 8.6–10.3)
CHLORIDE SERPLBLD-SCNC: 104.6 MMOL/L (ref 98–110)
CHOLEST SERPL-MCNC: 183 MG/DL (ref 0–199)
CHOLEST/HDLC SERPL: 3 {RATIO} (ref 0–5)
CO2 SERPL-SCNC: 27.2 MMOL/L (ref 20–32)
CREAT SERPL-MCNC: 0.7 MG/DL (ref 0.6–1.3)
GLOBULIN, CALCULATED - QUEST: 2.7 (ref 1.9–3.7)
GLUCOSE SERPL-MCNC: 95 MG/DL (ref 60–99)
HDLC SERPL-MCNC: 58 MG/DL (ref 40–150)
LDLC SERPL CALC-MCNC: 98 MG/DL (ref 0–130)
POTASSIUM SERPL-SCNC: 3.9 MMOL/L (ref 3.5–5.3)
PROT SERPL-MCNC: 6.7 G/DL (ref 6.1–8.1)
SODIUM SERPL-SCNC: 139.6 MMOL/L (ref 135–146)
TRIGL SERPL-MCNC: 133 MG/DL (ref 0–149)

## 2021-06-21 PROCEDURE — 99213 OFFICE O/P EST LOW 20 MIN: CPT | Performed by: PHYSICIAN ASSISTANT

## 2021-06-21 PROCEDURE — 36415 COLL VENOUS BLD VENIPUNCTURE: CPT | Performed by: PHYSICIAN ASSISTANT

## 2021-06-21 PROCEDURE — 80053 COMPREHEN METABOLIC PANEL: CPT | Performed by: PHYSICIAN ASSISTANT

## 2021-06-21 PROCEDURE — 80061 LIPID PANEL: CPT | Performed by: PHYSICIAN ASSISTANT

## 2021-06-21 RX ORDER — ATORVASTATIN CALCIUM 20 MG/1
20 TABLET, FILM COATED ORAL DAILY
Qty: 90 TABLET | Refills: 3 | Status: SHIPPED | OUTPATIENT
Start: 2021-06-21 | End: 2022-06-24

## 2021-06-21 ASSESSMENT — MIFFLIN-ST. JEOR: SCORE: 1613.35

## 2021-06-21 NOTE — NURSING NOTE
Klarissa is here for fasting med check.    Pre-Visit Screening:  Immunizations:UTD  Colonoscopy:Needs  Mammogram:Needs  Asthma Action Test/Plan:NA  PHQ9:NA  GAD7:NA  Questioned patient about current smoking habits Pt.never  OK to leave a detailed message on voice mail for today's visit yes, phone # [phone]  ACP dicussed  Hearing done

## 2021-06-21 NOTE — PROGRESS NOTES
Assessment & Plan     Mixed hyperlipidemia    - VENOUS COLLECTION  - Comprehensive Metobolic Panel (BFP)  - Lipid Panel (BFP)  - atorvastatin (LIPITOR) 20 MG tablet; Take 1 tablet (20 mg) by mouth daily    Primary osteoarthritis of left knee  Tria managed    Special screening for malignant neoplasms, colon    - GASTROENTEROLOGY ADULT REF PROCEDURE ONLY; Future    Encounter for screening mammogram for breast cancer    - RADIOLOGY REFERRAL  - MA Screen Bilateral w/Brad; Future    Obesity (BMI 30.0-34.9)          FUTURE APPOINTMENTS:       - Follow-up visit in 1 year fasting CPE and Pap      KAYLA Saenz  [facility]    Subjective     Nursing Notes:   Comfort Guevara CMA  6/21/2021  9:19 AM  Signed  Klarissa is here for fasting med check.    Pre-Visit Screening:  Immunizations:UTD  Colonoscopy:Needs  Mammogram:Needs  Asthma Action Test/Plan:NA  PHQ9:NA  GAD7:NA  Questioned patient about current smoking habits Pt.never  OK to leave a detailed message on voice mail for today's visit yes, phone # [phone]  ACP dicussed  Hearing done          Klarissa Zimmerman is a 50 year old female who presents to clinic today for the following health issues     HPI     Hep C and HIV screening - declines      Mammogram and colonoscopy due     Ho MD - left knee OA - Steroid injection last Wed and PT.   Xrays showed arthritis.     Hyperlipidemia Follow-Up      Are you regularly taking any medication or supplement to lower your cholesterol?   Yes- statin    Are you having muscle aches or other side effects that you think could be caused by your cholesterol lowering medication?  No      Do you have any barriers to taking your medication daily? no    Wt Readings from Last 5 Encounters:   06/21/21 96.1 kg (211 lb 12.8 oz)   05/27/20 95 kg (209 lb 5.8 oz)   04/19/19 105.7 kg (233 lb)   04/09/19 107.1 kg (236 lb 3.2 oz)   03/01/19 106.5 kg (234 lb 12.8 oz)     Intermittent fasting        Current  "Medications  Current Outpatient Medications   Medication Sig Dispense Refill     atorvastatin (LIPITOR) 20 MG tablet Take 1 tablet (20 mg) by mouth daily 90 tablet 3     CALCIUM + D OR QD       cetirizine (ZYRTEC) 5 MG tablet Take 5 mg by mouth daily       MULTI-VITAMIN OR TABS 1 TABLET DAILY           Constitutional, HEENT, Cardiovascular, Pulmonary, GI and  systems are negative, except as otherwise noted.          Objective    /76 (BP Location: Left arm, Patient Position: Sitting, Cuff Size: Adult Large)   Pulse 70   Temp 97.9  F (36.6  C) (Oral)   Ht 1.702 m (5' 7\")   Wt 96.1 kg (211 lb 12.8 oz)   LMP 06/04/2021 (Approximate)   SpO2 98%   BMI 33.17 kg/m    Body mass index is 33.17 kg/m .  Physical Exam   GENERAL: healthy, alert and no distress  EYES: Eyes grossly normal to inspection, PERRL and conjunctivae and sclerae normal  HENT: ear canals and TM's normal, nose and mouth without ulcers or lesions  NECK: no adenopathy, no asymmetry, masses, or scars and thyroid normal to palpation  RESP: lungs clear to auscultation - no rales, rhonchi or wheezes  CV: regular rate and rhythm, normal S1 S2, no S3 or S4, no murmur, click or rub, no peripheral edema and peripheral pulses strong  ABDOMEN: soft, nontender, no hepatosplenomegaly, no masses and bowel sounds normal  MS: no gross musculoskeletal defects noted, no edema      "

## 2021-08-16 ENCOUNTER — TRANSFERRED RECORDS (OUTPATIENT)
Dept: FAMILY MEDICINE | Facility: CLINIC | Age: 51
End: 2021-08-16

## 2021-10-23 ENCOUNTER — HEALTH MAINTENANCE LETTER (OUTPATIENT)
Age: 51
End: 2021-10-23

## 2022-02-12 ENCOUNTER — HEALTH MAINTENANCE LETTER (OUTPATIENT)
Age: 52
End: 2022-02-12

## 2022-04-22 ENCOUNTER — OFFICE VISIT (OUTPATIENT)
Dept: FAMILY MEDICINE | Facility: CLINIC | Age: 52
End: 2022-04-22

## 2022-04-22 VITALS
TEMPERATURE: 98.2 F | SYSTOLIC BLOOD PRESSURE: 128 MMHG | BODY MASS INDEX: 36.29 KG/M2 | WEIGHT: 231.2 LBS | HEART RATE: 72 BPM | HEIGHT: 67 IN | DIASTOLIC BLOOD PRESSURE: 72 MMHG | RESPIRATION RATE: 20 BRPM

## 2022-04-22 DIAGNOSIS — E66.811 OBESITY (BMI 30.0-34.9): ICD-10-CM

## 2022-04-22 DIAGNOSIS — E78.2 MIXED HYPERLIPIDEMIA: ICD-10-CM

## 2022-04-22 DIAGNOSIS — M17.12 PRIMARY OSTEOARTHRITIS OF LEFT KNEE: ICD-10-CM

## 2022-04-22 DIAGNOSIS — Z01.818 PREOP GENERAL PHYSICAL EXAM: Primary | ICD-10-CM

## 2022-04-22 LAB
ERYTHROCYTE [DISTWIDTH] IN BLOOD BY AUTOMATED COUNT: 12.7 %
HCT VFR BLD AUTO: 42.3 % (ref 35–47)
HEMOGLOBIN: 13.7 G/DL (ref 11.7–15.7)
MCH RBC QN AUTO: 29.5 PG (ref 26–33)
MCHC RBC AUTO-ENTMCNC: 32.4 G/DL (ref 31–36)
MCV RBC AUTO: 90.9 FL (ref 78–100)
PLATELET COUNT - QUEST: 292 10^9/L (ref 150–375)
PREG. TEST: NORMAL
RBC # BLD AUTO: 4.65 10*12/L (ref 3.8–5.2)
WBC # BLD AUTO: 5.6 10*9/L (ref 4–11)

## 2022-04-22 PROCEDURE — 85027 COMPLETE CBC AUTOMATED: CPT | Performed by: PHYSICIAN ASSISTANT

## 2022-04-22 PROCEDURE — 99214 OFFICE O/P EST MOD 30 MIN: CPT | Mod: 25 | Performed by: PHYSICIAN ASSISTANT

## 2022-04-22 PROCEDURE — 93000 ELECTROCARDIOGRAM COMPLETE: CPT | Performed by: PHYSICIAN ASSISTANT

## 2022-04-22 PROCEDURE — 81025 URINE PREGNANCY TEST: CPT | Performed by: PHYSICIAN ASSISTANT

## 2022-04-22 PROCEDURE — 36415 COLL VENOUS BLD VENIPUNCTURE: CPT | Performed by: PHYSICIAN ASSISTANT

## 2022-04-22 NOTE — NURSING NOTE
Klarissa Zimmerman is here for a pre-op exam.    Questioned patient about current smoking habits.  Pt. has never smoked.  PULSE regular  My Chart: active  CLASSIFICATION OF OVERWEIGHT AND OBESITY BY BMI                        Obesity Class           BMI(kg/m2)  Underweight                                    < 18.5  Normal                                         18.5-24.9  Overweight                                     25.0-29.9  OBESITY                     I                  30.0-34.9                             II                 35.0-39.9  EXTREME OBESITY             III                >40                            Patient's  BMI Body mass index is 36.21 kg/m .  http://hin.nhlbi.nih.gov/menuplanner/menu.cgi  Pre-visit planning  Immunizations - up to date  Colonoscopy - is up to date  Mammogram - is up to date  Asthma -   PHQ9 -    IVIS-7 -      The patient has verbalized that it is ok to leave a detailed voice message on the patient's cell phone with results/recommendations from this visit.

## 2022-04-22 NOTE — PROGRESS NOTES
[facility]  [address] [place] [address]  [address]  [facility] [address]  Phone: [phone]  Fax: [phone]  Primary Provider: Luis Felipe Calvo  Pre-op Performing Provider: LUIS FELIPE CALVO      PREOPERATIVE EVALUATION:  Today's date: 4/22/2022    Klarissa Zimmerman is a 51 year old female who presents for a preoperative evaluation.    Surgical Information:  Surgery/Procedure: left knee total   Surgery Location: [facility]  Surgeon: Dr Christina Pillai  Surgery Date: 5/6/22  Time of Surgery: am  Where patient plans to recover: At home with family  Fax number for surgical facility: [phone]  Type of Anesthesia Anticipated: to be determined    Assessment & Plan     The proposed surgical procedure is considered INTERMEDIATE risk.    1. Preop general physical exam    2. Primary osteoarthritis of left knee    3. Obesity (BMI 30.0-34.9)    4. Mixed hyperlipidemia          - No ibuprofen, Aspirin, Aleve or other NSAID 7-10 days prior to surgery  - If patient develops cough, cold, URI, fevers he/she has been instructed to return to clinic prior to surgery  - Nothing to eat/drink 12 hours prior to procedure unless directed otherwise by surgeon.        Risks and Recommendations:  The patient has the following additional risks and recommendations for perioperative complications:   - No identified additional risk factors other than previously addressed    Medication Instructions:  Patient is to take all scheduled medications on the day of surgery    RECOMMENDATION:  APPROVAL GIVEN to proceed with proposed procedure, without further diagnostic evaluation.      Subjective      OA in left knee - with pain    1. No - Have you ever had a heart attack or stroke?  2. No - Have you ever had surgery on your heart or blood vessels, such as a stent, coronary (heart) bypass, or surgery on an artery in the head, neck, heart, or legs?  3. No - Do you have chest pain when you are physically  active?  4. No - Do you have a history of heart failure?  5. No - Do you currently have a cold, bronchitis, or symptoms of other respiratory (head and chest) infections?  6. No - Do you have a cough, shortness of breath, or wheezing?  7. No - Do you or anyone in your family have a history of blood clots?  8. No - Do you or anyone in your family have a serious bleeding problem, such as long-lasting bleeding after surgeries or cuts?  9. No - Have you ever had anemia or been told to take iron pills?  10. No - Have you had any abnormal blood loss such as black, tarry or bloody stools, or abnormal vaginal bleeding?  11. No - Have you ever had a blood transfusion?  12. Yes - Are you willing to have a blood transfusion if it is medically needed before, during, or after your surgery?  13. YES - Have you or anyone in your family ever had problems with anesthesia (sedation for surgery)? Mom with PONV  14. No - Do you have sleep apnea, excessive snoring, or daytime drowsiness?   15. No - Do you have any artifical heart valves or other implanted medical devices, such as a pacemaker, defibrillator, or continuous glucose monitor?  16. No - Do you have any artifical joints?  17. No - Are you allergic to latex?  18. No - Is there any chance that you may be pregnant?    Health Care Directive:  Patient does not have a Health Care Directive or Living Will: Discussed advance care planning with patient; information given to patient to review.    Preoperative Review of :   reviewed - no record of controlled substances prescribed.      Status of Chronic Conditions:  HYPERLIPIDEMIA - Patient has a long history of significant Hyperlipidemia requiring medication for treatment with recent good control. Patient reports no problems or side effects with the medication.       Review of Systems  CONSTITUTIONAL: NEGATIVE for fever, chills, change in weight  INTEGUMENTARY/SKIN: NEGATIVE for worrisome rashes, moles or lesions  EYES: NEGATIVE  for vision changes or irritation  ENT/MOUTH: NEGATIVE for ear, mouth and throat problems  RESP: NEGATIVE for significant cough or SOB  CV: NEGATIVE for chest pain, palpitations or peripheral edema  GI: NEGATIVE for nausea, abdominal pain, heartburn, or change in bowel habits  : NEGATIVE for frequency, dysuria, or hematuria  NEURO: NEGATIVE for weakness, dizziness or paresthesias  ENDOCRINE: NEGATIVE for temperature intolerance, skin/hair changes  HEME: NEGATIVE for bleeding problems  PSYCHIATRIC: NEGATIVE for changes in mood or affect    Patient Active Problem List    Diagnosis Date Noted     Primary osteoarthritis of left knee 2021     Priority: Medium     Obesity (BMI 30.0-34.9) 2019     Priority: Medium     Health Care Home 10/11/2013     Priority: Medium     State Tier Level:  Tier 1  Status:  n/a  Care Coordinator:  n/a     See Letters for [facility] Care Plan           ACP (advance care planning) 2011     Priority: Medium     Advance Care Planning:   ACP Review and Resources Provided:  Reviewed chart for advance care plan.  Klarissa Zimmerman has no plan or code status on file. Discussed available resources and provided with information. Confirmed code status reflects current choices pending further ACP discussions.  Confirmed/documented designated decision maker(s). See permanent comments section of demographics in clinical tab.   Added by Melba Taylor on 2014             Mixed hyperlipidemia      Priority: Medium      Past Medical History:   Diagnosis Date     Elevated blood pressure reading without diagnosis of hypertension 2018     Mixed hyperlipidemia      Past Surgical History:   Procedure Laterality Date     DENTAL SURGERY        LAPAROSCOPY, SURGICAL; CHOLECYSTECTOMY  2003    Cholecystectomy, Laparoscopic     ZZC  DELIVERY ONLY  2003    , Low Cervical     ZZC  DELIVERY ONLY  2006    , Low Cervical     ZZC LIGATE FALLOPIAN  "TUBE,POSTPARTUM  06/01/2006    Tubal Ligation     Current Outpatient Medications   Medication Sig Dispense Refill     atorvastatin (LIPITOR) 20 MG tablet Take 1 tablet (20 mg) by mouth daily 90 tablet 3     Calcium Carb-Cholecalciferol (CALCIUM 500/D) 500-400 MG-UNIT CHEW Take 1 chew tab by mouth daily       cetirizine (ZYRTEC) 5 MG tablet Take 5 mg by mouth daily       MULTI-VITAMIN OR TABS 1 TABLET DAILY         Allergies   Allergen Reactions     No Known Allergies         Social History     Tobacco Use     Smoking status: Never Smoker     Smokeless tobacco: Never Used   Substance Use Topics     Alcohol use: Yes     Alcohol/week: 0.0 standard drinks     Comment: 1 per month     Family History   Problem Relation Age of Onset     Hypertension Mother      Breast Cancer Mother 60     Cancer Mother 73        uterine, carcinosarcoma     Depression Mother      Transient ischemic attack Mother      Seizure Disorder Mother         due to high blood pressure or infection??     Prostate Cancer Father      Depression Sister      Hypertension Sister      Hyperlipidemia Sister      Heart Disease Maternal Grandmother         CHF at 90     Diabetes Other         1st cousin     Cancer Paternal Grandfather         prostate     Diabetes Maternal Aunt      Hyperlipidemia Maternal Aunt      Cancer - colorectal No family hx of      History   Drug Use No         Objective     /72 (BP Location: Left arm, Patient Position: Chair, Cuff Size: Adult Large)   Pulse 72   Temp 98.2  F (36.8  C)   Resp 20   Ht 1.702 m (5' 7\")   Wt 104.9 kg (231 lb 3.2 oz)   BMI 36.21 kg/m      Physical Exam    GENERAL APPEARANCE: healthy, alert and no distress     EYES: EOMI, PERRL     HENT: ear canals and TM's normal and nose and mouth without ulcers or lesions     NECK: no adenopathy, no asymmetry, masses, or scars and thyroid normal to palpation     RESP: lungs clear to auscultation - no rales, rhonchi or wheezes     CV: regular rates and rhythm, " normal S1 S2, no S3 or S4 and no murmur, click or rub     ABDOMEN:  soft, nontender, no HSM or masses and bowel sounds normal     MS: extremities normal- no gross deformities noted     SKIN: no suspicious lesions or rashes     NEURO: Normal strength and tone, sensory exam grossly normal, mentation intact and speech normal     PSYCH: mentation appears normal. and affect normal/bright     LYMPHATICS: No cervical adenopathy    Recent Labs   Lab Test 06/21/21  1242 05/27/20  0000   .6 141.4   POTASSIUM 3.90 4.22   CR 0.70 0.73        Diagnostics:  Component      Latest Ref Rng & Units 4/22/2022   WBC      4.0 - 11 10*9/L 5.6   RBC Count      3.8 - 5.2 10*12/L 4.65   Hemoglobin      11.7 - 15.7 g/dL 13.7   Hematocrit      35.0 - 47.0 % 42.3   MCV      78 - 100 fL 90.9   MCH      26 - 33 pg 29.5   MCHC      31 - 36 g/dL 32.4   RDW      % 12.7   Platelet Count      150 - 375 10:9/L 292   Pregnancy Test       NEG       EKG: appears normal, NSR, normal axis, normal intervals, no acute ST/T changes c/w ischemia, no LVH by voltage criteria    Revised Cardiac Risk Index (RCRI):  The patient has the following serious cardiovascular risks for perioperative complications:   - No serious cardiac risks = 0 points     RCRI Interpretation: 0 points: Class I (very low risk - 0.4% complication rate)           Signed Electronically by: KAYLA Saenz  Copy of this evaluation report is provided to requesting physician.

## 2022-06-12 DIAGNOSIS — E78.2 MIXED HYPERLIPIDEMIA: ICD-10-CM

## 2022-06-13 RX ORDER — ATORVASTATIN CALCIUM 20 MG/1
20 TABLET, FILM COATED ORAL DAILY
Qty: 90 TABLET | Refills: 0 | COMMUNITY
Start: 2022-06-13

## 2022-06-13 NOTE — TELEPHONE ENCOUNTER
Klarissa Zimmerman is requesting a refill of:    Refused Prescriptions:                       Disp   Refills    atorvastatin (LIPITOR) 20 MG tablet [Pharm*90 tab*0        Sig: Take 1 tablet (20 mg) by mouth daily  Refused By: NADIR BRISCOE  Reason for Refusal: Patient needs appointment    Last med recheck 06/21/21, due for yearly FASTING med recheck

## 2022-06-24 ENCOUNTER — OFFICE VISIT (OUTPATIENT)
Dept: FAMILY MEDICINE | Facility: CLINIC | Age: 52
End: 2022-06-24

## 2022-06-24 VITALS
HEART RATE: 80 BPM | SYSTOLIC BLOOD PRESSURE: 128 MMHG | TEMPERATURE: 97.6 F | WEIGHT: 231 LBS | BODY MASS INDEX: 36.26 KG/M2 | OXYGEN SATURATION: 97 % | HEIGHT: 67 IN | DIASTOLIC BLOOD PRESSURE: 80 MMHG

## 2022-06-24 DIAGNOSIS — E66.01 MORBID OBESITY (H): ICD-10-CM

## 2022-06-24 DIAGNOSIS — E78.2 MIXED HYPERLIPIDEMIA: Primary | ICD-10-CM

## 2022-06-24 PROBLEM — E66.9 OBESITY (BMI 30-39.9): Status: ACTIVE | Noted: 2019-04-09

## 2022-06-24 LAB
ALBUMIN SERPL-MCNC: 4.3 G/DL (ref 3.6–5.1)
ALBUMIN/GLOB SERPL: 1.6 {RATIO} (ref 1–2.5)
ALP SERPL-CCNC: 94 U/L (ref 33–130)
ALT 1742-6: 21 U/L (ref 0–32)
AST 1920-8: 20 U/L (ref 0–35)
BILIRUB SERPL-MCNC: 0.6 MG/DL (ref 0.2–1.2)
BUN SERPL-MCNC: 12 MG/DL (ref 7–25)
BUN/CREATININE RATIO: 16.2 (ref 6–22)
CALCIUM SERPL-MCNC: 9.3 MG/DL (ref 8.6–10.3)
CHLORIDE SERPLBLD-SCNC: 107.7 MMOL/L (ref 98–110)
CHOLEST SERPL-MCNC: 195 MG/DL (ref 0–199)
CHOLEST/HDLC SERPL: 4 {RATIO} (ref 0–5)
CO2 SERPL-SCNC: 25.8 MMOL/L (ref 20–32)
CREAT SERPL-MCNC: 0.74 MG/DL (ref 0.6–1.3)
GLOBULIN, CALCULATED - QUEST: 2.7 (ref 1.9–3.7)
GLUCOSE SERPL-MCNC: 88 MG/DL (ref 60–99)
HDLC SERPL-MCNC: 51 MG/DL (ref 40–150)
LDLC SERPL CALC-MCNC: 105 MG/DL (ref 0–130)
POTASSIUM SERPL-SCNC: 4.22 MMOL/L (ref 3.5–5.3)
PROT SERPL-MCNC: 7 G/DL (ref 6.1–8.1)
SODIUM SERPL-SCNC: 141.4 MMOL/L (ref 135–146)
TRIGL SERPL-MCNC: 193 MG/DL (ref 0–149)

## 2022-06-24 PROCEDURE — 36415 COLL VENOUS BLD VENIPUNCTURE: CPT | Performed by: PHYSICIAN ASSISTANT

## 2022-06-24 PROCEDURE — 80053 COMPREHEN METABOLIC PANEL: CPT | Performed by: PHYSICIAN ASSISTANT

## 2022-06-24 PROCEDURE — 99213 OFFICE O/P EST LOW 20 MIN: CPT | Performed by: PHYSICIAN ASSISTANT

## 2022-06-24 PROCEDURE — 80061 LIPID PANEL: CPT | Performed by: PHYSICIAN ASSISTANT

## 2022-06-24 RX ORDER — ATORVASTATIN CALCIUM 20 MG/1
20 TABLET, FILM COATED ORAL DAILY
Qty: 90 TABLET | Refills: 3 | Status: SHIPPED | OUTPATIENT
Start: 2022-06-24 | End: 2022-07-18

## 2022-06-24 NOTE — NURSING NOTE
Chief Complaint   Patient presents with     Recheck Medication     Refill medications, fasting today      Pre-visit Screening:  Immunizations:  up to date  Colonoscopy:  is up to date  Mammogram: is up to date  Asthma Action Test/Plan:  NA  PHQ9:  NA  GAD7:  NA  Questioned patient about current smoking habits Pt. has never smoked.  Ok to leave detailed message on voice mail for today's visit only Yes, phone # [phone]

## 2022-06-24 NOTE — PROGRESS NOTES
Assessment & Plan     Mixed hyperlipidemia  If labs abnl, recheck at physical  - VENOUS COLLECTION  - Comprehensive Metobolic Panel (BFP)  - Lipid Panel (BFP)  - atorvastatin (LIPITOR) 20 MG tablet  Dispense: 90 tablet; Refill: 3    Morbid obesity (H)      Due for CPE and Pap in the next 3 months      KAYLA Saenz  [facility]    Subjective     Nursing Notes:   Ingris Ayoub CMA  6/24/2022  9:37 AM  Signed  Chief Complaint   Patient presents with     Recheck Medication     Refill medications, fasting today      Pre-visit Screening:  Immunizations:  up to date  Colonoscopy:  is up to date  Mammogram: is up to date  Asthma Action Test/Plan:  NA  PHQ9:  NA  GAD7:  NA  Questioned patient about current smoking habits Pt. has never smoked.  Ok to leave detailed message on voice mail for today's visit only Yes, phone # [phone]                Klarsisa Zimmerman is a 51 year old female who presents to clinic today for the following health issues     HPI     DUE FOR PAP    Hyperlipidemia Follow-Up - may have missed a day or two      Are you regularly taking any medication or supplement to lower your cholesterol?   Yes- atorvastatin 20 mg    Are you having muscle aches or other side effects that you think could be caused by your cholesterol lowering medication?  No              Current Medications  Current Outpatient Medications   Medication Sig Dispense Refill     atorvastatin (LIPITOR) 20 MG tablet Take 1 tablet (20 mg) by mouth daily 90 tablet 3     Calcium Carb-Cholecalciferol (CALCIUM 500/D) 500-400 MG-UNIT CHEW Take 1 chew tab by mouth daily       cetirizine (ZYRTEC) 5 MG tablet Take 5 mg by mouth daily       MULTI-VITAMIN OR TABS 1 TABLET DAILY           Constitutional, HEENT, Cardiovascular, Pulmonary, GI and  systems are negative, except as otherwise noted.          Objective    /80 (BP Location: Right arm, Patient Position: Sitting, Cuff Size: Adult Large)   Pulse 80   " Temp 97.6  F (36.4  C) (Temporal)   Ht 1.702 m (5' 7\")   Wt 104.8 kg (231 lb)   SpO2 97%   BMI 36.18 kg/m    Body mass index is 36.18 kg/m .  Physical Exam   GENERAL: healthy, alert and no distress  RESP: lungs clear to auscultation - no rales, rhonchi or wheezes  CV: regular rate and rhythm, normal S1 S2, no S3 or S4, no murmur, click or rub, no peripheral edema and peripheral pulses strong  Abdomen: Normal bowel sounds. Soft, no masses appreciated, no organomegaly. Non-tender. No guarding, no rebound tenderness.   MS: no gross musculoskeletal defects noted        "

## 2022-07-18 ENCOUNTER — OFFICE VISIT (OUTPATIENT)
Dept: FAMILY MEDICINE | Facility: CLINIC | Age: 52
End: 2022-07-18

## 2022-07-18 VITALS
RESPIRATION RATE: 22 BRPM | HEIGHT: 67 IN | TEMPERATURE: 98 F | SYSTOLIC BLOOD PRESSURE: 138 MMHG | HEART RATE: 90 BPM | OXYGEN SATURATION: 96 % | BODY MASS INDEX: 36.57 KG/M2 | DIASTOLIC BLOOD PRESSURE: 82 MMHG | WEIGHT: 233 LBS

## 2022-07-18 DIAGNOSIS — Z01.419 ENCOUNTER FOR GYNECOLOGICAL EXAMINATION WITHOUT ABNORMAL FINDING: Primary | ICD-10-CM

## 2022-07-18 DIAGNOSIS — Z12.4 SCREENING FOR CERVICAL CANCER: ICD-10-CM

## 2022-07-18 DIAGNOSIS — E78.2 MIXED HYPERLIPIDEMIA: ICD-10-CM

## 2022-07-18 DIAGNOSIS — E66.01 MORBID OBESITY (H): ICD-10-CM

## 2022-07-18 DIAGNOSIS — Z96.652 STATUS POST TOTAL LEFT KNEE REPLACEMENT: ICD-10-CM

## 2022-07-18 PROCEDURE — 99396 PREV VISIT EST AGE 40-64: CPT | Performed by: PHYSICIAN ASSISTANT

## 2022-07-18 PROCEDURE — 87624 HPV HI-RISK TYP POOLED RSLT: CPT | Mod: 90 | Performed by: PHYSICIAN ASSISTANT

## 2022-07-18 PROCEDURE — 88142 CYTOPATH C/V THIN LAYER: CPT | Mod: 90 | Performed by: PHYSICIAN ASSISTANT

## 2022-07-18 RX ORDER — ATORVASTATIN CALCIUM 20 MG/1
20 TABLET, FILM COATED ORAL DAILY
Qty: 90 TABLET | Refills: 3 | Status: SHIPPED | OUTPATIENT
Start: 2022-07-18 | End: 2023-10-20

## 2022-07-18 NOTE — NURSING NOTE
Chief Complaint   Patient presents with     Physical     Annual, non fasting, due for PAP      Pre-visit Screening:  Immunizations:  up to date  Colonoscopy:  is up to date  Mammogram: is up to date  Asthma Action Test/Plan:  NA  PHQ9:  NA  GAD7:  NA  Questioned patient about current smoking habits Pt. has never smoked.  Ok to leave detailed message on voice mail for today's visit only Yes, phone # [phone]

## 2022-07-18 NOTE — PROGRESS NOTES
SUBJECTIVE:   CC: Klarissa Zimmerman is an 51 year old woman who presents for preventive health visit.           Patient has been advised of split billing requirements and indicates understanding: Yes  HPI          Hyperlipidemia Follow-Up      Are you regularly taking any medication or supplement to lower your cholesterol?   Yes- atorvastatin    Are you having muscle aches or other side effects that you think could be caused by your cholesterol lowering medication?        Today's PHQ-2 Score:   PHQ-2 (  [employer]) 2022   Q1: Little interest or pleasure in doing things 0   Q2: Feeling down, depressed or hopeless 0   PHQ-2 Score 0   PHQ-2 Total Score (12-17 Years)- Positive if 3 or more points; Administer PHQ-A if positive -       Abuse: Current or Past (Physical, Sexual or Emotional) - No  Do you feel safe in your environment? Yes    Breast cancer screening discussion: mother had breast CA        Lab work is in process  Labs reviewed in EPIC  BP Readings from Last 3 Encounters:   22 128/80   22 128/72   21 124/76    Wt Readings from Last 3 Encounters:   22 104.8 kg (231 lb)   22 104.9 kg (231 lb 3.2 oz)   21 96.1 kg (211 lb 12.8 oz)                  Patient Active Problem List   Diagnosis     ACP (advance care planning)     Health Care Home     Morbid obesity (H)     Primary osteoarthritis of left knee     Mixed hyperlipidemia     Past Surgical History:   Procedure Laterality Date     DENTAL SURGERY        LAPAROSCOPY, SURGICAL; CHOLECYSTECTOMY  2003    Cholecystectomy, Laparoscopic     left knee replacement  2022     ZZC  DELIVERY ONLY  2003    , Low Cervical     ZZC  DELIVERY ONLY  2006    , Low Cervical     ZZC LIGATE FALLOPIAN TUBE,POSTPARTUM  2006    Tubal Ligation       Social History     Tobacco Use     Smoking status: Never Smoker     Smokeless tobacco: Never Used   Substance Use Topics     Alcohol  use: Yes     Alcohol/week: 0.0 standard drinks     Comment: 1 per month     Family History   Problem Relation Age of Onset     Hypertension Mother      Breast Cancer Mother 60     Cancer Mother 73        uterine, carcinosarcoma     Depression Mother      Transient ischemic attack Mother      Seizure Disorder Mother         due to high blood pressure or infection??     Prostate Cancer Father      Depression Sister      Hypertension Sister      Hyperlipidemia Sister      Heart Disease Maternal Grandmother         CHF at 90     Diabetes Other         1st cousin     Cancer Paternal Grandfather         prostate     Diabetes Maternal Aunt      Hyperlipidemia Maternal Aunt      Cancer - colorectal No family hx of          Current Outpatient Medications   Medication Sig Dispense Refill     atorvastatin (LIPITOR) 20 MG tablet Take 1 tablet (20 mg) by mouth daily 90 tablet 3     Calcium Carb-Cholecalciferol (CALCIUM 500/D) 500-400 MG-UNIT CHEW Take 1 chew tab by mouth daily       cetirizine (ZYRTEC) 5 MG tablet Take 5 mg by mouth daily       MULTI-VITAMIN OR TABS 1 TABLET DAILY       Allergies   Allergen Reactions     No Known Allergies      Recent Labs   Lab Test 06/24/22  0000 06/21/21  1242 06/21/21  1240 05/27/20  0000 04/19/19  0953 03/01/19  0943 11/24/17  1005     --  98 100 106*   < > 162*   HDL 51  --  58 55 47*   < > 46*   TRIG 193*  --  133 94 141   < > 153*   ALT  --   --   --   --  28  --  21   CR 0.74 0.70  --  0.73  --   --  0.60   GFRESTIMATED  --   --   --   --   --   --  109   POTASSIUM 4.22 3.90  --  4.22  --   --  4.1    < > = values in this interval not displayed.                           Past Medical History:   Diagnosis Date     Elevated blood pressure reading without diagnosis of hypertension 4/5/2018     Mixed hyperlipidemia       Past Surgical History:   Procedure Laterality Date     DENTAL SURGERY  1990      LAPAROSCOPY, SURGICAL; CHOLECYSTECTOMY  07/01/2003    Cholecystectomy,  Laparoscopic     left knee replacement  2022     [facility]  DELIVERY ONLY  2003    , Low Cervical     [facility]  DELIVERY ONLY  2006    , Low Cervical     [facility] LIGATE FALLOPIAN TUBE,POSTPARTUM  2006    Tubal Ligation       Review of Systems  CONSTITUTIONAL: NEGATIVE for fever, chills, change in weight  INTEGUMENTARY/SKIN: NEGATIVE for worrisome rashes, moles or lesions  EYES: NEGATIVE for vision changes or irritation  ENT: NEGATIVE for ear, mouth and throat problems  RESP: NEGATIVE for significant cough or SOB  BREAST: NEGATIVE for masses, tenderness or discharge  CV: NEGATIVE for chest pain, palpitations or peripheral edema  GI: NEGATIVE for nausea, abdominal pain, heartburn, or change in bowel habits  : NEGATIVE for unusual urinary or vaginal symptoms. No vaginal bleeding.  MUSCULOSKELETAL: Still in PT for total left knee  NEURO: NEGATIVE for weakness, dizziness or paresthesias  PSYCHIATRIC: NEGATIVE for changes in mood or affect      OBJECTIVE:   There were no vitals taken for this visit.  Physical Exam  GENERAL: healthy, alert and no distress  EYES: Eyes grossly normal to inspection, PERRL and conjunctivae and sclerae normal  HENT: ear canals and TM's normal, nose and mouth without ulcers or lesions  NECK: no adenopathy, no asymmetry, masses, or scars and thyroid normal to palpation  RESP: lungs clear to auscultation - no rales, rhonchi or wheezes  BREAST: normal without masses, tenderness or nipple discharge and no palpable axillary masses or adenopathy  CV: regular rate and rhythm, normal S1 S2, no S3 or S4, no murmur, click or rub, no peripheral edema and peripheral pulses strong  ABDOMEN: soft, nontender, no hepatosplenomegaly, no masses and bowel sounds normal  MS: no gross musculoskeletal defects noted, no edema  - surgical scar  left knee well approximated  SKIN: no suspicious lesions or rashes  NEURO: Normal strength and tone, mentation intact and speech  "normal  PSYCH: mentation appears normal, affect normal/bright    Diagnostic Test Results:  Labs reviewed in Epic    ASSESSMENT/PLAN:   Klarissa was seen today for physical.    Diagnoses and all orders for this visit:    Encounter for gynecological examination without abnormal finding    Mixed hyperlipidemia  -     atorvastatin (LIPITOR) 20 MG tablet; Take 1 tablet (20 mg) by mouth daily    Screening for cervical cancer  -     ThinPrep Pap and HPV mRna E6/E7 ([facility])    Morbid obesity (H)    Status post total left knee replacement        Patient has been advised of split billing requirements and indicates understanding: Yes    COUNSELING:  Reviewed preventive health counseling, as reflected in patient instructions    Estimated body mass index is 36.18 kg/m  as calculated from the following:    Height as of 6/24/22: 1.702 m (5' 7\").    Weight as of 6/24/22: 104.8 kg (231 lb).    Weight management plan: Discussed healthy diet and exercise guidelines    She reports that she has never smoked. She has never used smokeless tobacco.      Counseling Resources:  ATP IV Guidelines  Pooled Cohorts Equation Calculator  Breast Cancer Risk Calculator  BRCA-Related Cancer Risk Assessment: FHS-7 Tool  FRAX Risk Assessment  ICSI Preventive Guidelines  Dietary Guidelines for Americans, 2010  USDA's MyPlate  ASA Prophylaxis  Lung CA Screening    KAYLA Saenz  [facility]    "

## 2022-07-20 LAB
CLINICAL HISTORY - QUEST: NORMAL
COMMENT - QUEST: NORMAL
CYTOTECHNOLOGIST - QUEST: NORMAL
DESCRIPTIVE DIAGNOSIS - QUEST: NORMAL
HPV MRNA E6/E7: NOT DETECTED
LAST PAP DX - QUEST: NORMAL
LMP - QUEST: NORMAL
PREV BX DX - QUEST: NORMAL
SOURCE: NORMAL
STATEMENT OF ADEQUACY - QUEST: NORMAL

## 2022-10-09 ENCOUNTER — HEALTH MAINTENANCE LETTER (OUTPATIENT)
Age: 52
End: 2022-10-09

## 2022-11-26 ENCOUNTER — HEALTH MAINTENANCE LETTER (OUTPATIENT)
Age: 52
End: 2022-11-26

## 2023-08-19 ENCOUNTER — HEALTH MAINTENANCE LETTER (OUTPATIENT)
Age: 53
End: 2023-08-19

## 2023-10-09 DIAGNOSIS — E78.2 MIXED HYPERLIPIDEMIA: ICD-10-CM

## 2023-10-10 RX ORDER — ATORVASTATIN CALCIUM 20 MG/1
20 TABLET, FILM COATED ORAL DAILY
Qty: 90 TABLET | Refills: 0 | COMMUNITY
Start: 2023-10-10

## 2023-10-10 NOTE — TELEPHONE ENCOUNTER
Klarissa Zimmerman is requesting a refill of:    Refused Prescriptions:                       Disp   Refills    atorvastatin (LIPITOR) 20 MG tablet [Pharm*90 tab*0        Sig: Take 1 tablet (20 mg) by mouth daily  Refused By: NADIR BRISCOE  Reason for Refusal: Patient needs appointment    Pt due for FASTING OV

## 2023-10-20 ENCOUNTER — OFFICE VISIT (OUTPATIENT)
Dept: FAMILY MEDICINE | Facility: CLINIC | Age: 53
End: 2023-10-20

## 2023-10-20 VITALS
TEMPERATURE: 97.8 F | DIASTOLIC BLOOD PRESSURE: 82 MMHG | BODY MASS INDEX: 36.26 KG/M2 | HEART RATE: 77 BPM | OXYGEN SATURATION: 97 % | HEIGHT: 67 IN | WEIGHT: 231 LBS | SYSTOLIC BLOOD PRESSURE: 134 MMHG

## 2023-10-20 DIAGNOSIS — E78.2 MIXED HYPERLIPIDEMIA: Primary | ICD-10-CM

## 2023-10-20 DIAGNOSIS — E66.01 MORBID OBESITY (H): ICD-10-CM

## 2023-10-20 LAB
ALBUMIN SERPL-MCNC: 4.2 G/DL (ref 3.6–5.1)
ALBUMIN/GLOB SERPL: 1.6 {RATIO} (ref 1–2.5)
ALP SERPL-CCNC: 89 U/L (ref 33–130)
ALT 1742-6: 25 U/L (ref 0–32)
AST 1920-8: 19 U/L (ref 0–35)
BILIRUB SERPL-MCNC: 0.8 MG/DL (ref 0.2–1.2)
BUN SERPL-MCNC: 12 MG/DL (ref 7–25)
BUN/CREATININE RATIO: 18.8 (ref 6–32)
CALCIUM SERPL-MCNC: 8.9 MG/DL (ref 8.6–10.3)
CHLORIDE SERPLBLD-SCNC: 107 MMOL/L (ref 98–110)
CHOLEST SERPL-MCNC: 200 MG/DL (ref 0–199)
CHOLEST/HDLC SERPL: 4 {RATIO} (ref 0–5)
CO2 SERPL-SCNC: 25.3 MMOL/L (ref 20–32)
CREAT SERPL-MCNC: 0.64 MG/DL (ref 0.6–1.3)
GLOBULIN, CALCULATED - QUEST: 2.7 (ref 1.9–3.7)
GLUCOSE SERPL-MCNC: 88 MG/DL (ref 60–99)
HDLC SERPL-MCNC: 53 MG/DL (ref 40–150)
LDLC SERPL CALC-MCNC: 121 MG/DL (ref 0–130)
POTASSIUM SERPL-SCNC: 4.07 MMOL/L (ref 3.5–5.3)
PROT SERPL-MCNC: 6.9 G/DL (ref 6.1–8.1)
SODIUM SERPL-SCNC: 140.2 MMOL/L (ref 135–146)
TRIGL SERPL-MCNC: 128 MG/DL (ref 0–149)

## 2023-10-20 PROCEDURE — 99213 OFFICE O/P EST LOW 20 MIN: CPT

## 2023-10-20 PROCEDURE — 36415 COLL VENOUS BLD VENIPUNCTURE: CPT

## 2023-10-20 PROCEDURE — 80053 COMPREHEN METABOLIC PANEL: CPT

## 2023-10-20 PROCEDURE — 80061 LIPID PANEL: CPT

## 2023-10-20 RX ORDER — ATORVASTATIN CALCIUM 20 MG/1
20 TABLET, FILM COATED ORAL DAILY
Qty: 90 TABLET | Refills: 3 | Status: SHIPPED | OUTPATIENT
Start: 2023-10-20

## 2023-10-20 NOTE — NURSING NOTE
Chief Complaint   Patient presents with    Recheck Medication     Fasting today, refill medications     Pre-visit Screening:  Immunizations:  up to date  Colonoscopy:  is up to date  Mammogram: is up to date  Asthma Action Test/Plan:  NA  PHQ9:  NA  GAD7:  NA  Questioned patient about current smoking habits Pt. has never smoked.  Ok to leave detailed message on voice mail for today's visit only Yes, phone # [phone]

## 2023-10-20 NOTE — PATIENT INSTRUCTIONS
Thanks for coming in today Klarissa!    I will send you a my chart message once I get all of your lab results back.     Please let me know if you have any questions or concerns in the meantime.

## 2023-10-20 NOTE — PROGRESS NOTES
"  Assessment & Plan     1. Mixed hyperlipidemia  - Well controlled, continue Atorvastatin 20 mg daily, refills sent. Also encouraged her to continue healthy eating habits and daily exercise.   - VENOUS COLLECTION  - Lipid Panel (BFP)  - atorvastatin (LIPITOR) 20 MG tablet; Take 1 tablet (20 mg) by mouth daily  Dispense: 90 tablet; Refill: 3    2. Morbid obesity (H)  - Continue healthy eating habits and daily exercise.   - VENOUS COLLECTION  - Comprehensive Metobolic Panel (BFP)    BMI:   Estimated body mass index is 36.73 kg/m  as calculated from the following:    Height as of this encounter: 1.689 m (5' 6.5\").    Weight as of this encounter: 104.8 kg (231 lb).     Weight management plan: Discussed healthy diet and exercise guidelines    Return in about 1 year (around 10/20/2024) for Follow up, Routine preventive.    Olga Jara PA-C  [facility]    Subjective   Klarissa is a 53 year old, presenting for the following health issues:  Recheck Medication (Fasting today, refill medications)    HPI     Klarissa presents for a fasting medication recheck. She has a history of hyperlipidemia and takes Atorvastatin 20 mg daily. She denies any side effects of the medication.     Hyperlipidemia Follow-Up    Are you regularly taking any medication or supplement to lower your cholesterol?  Yes, Atorvastatin 20 mg daily after dinner.     Are you having muscle aches or other side effects that you think could be caused by your cholesterol lowering medication?  No.     BP Readings from Last 2 Encounters:   10/20/23 134/82   07/18/22 138/82     LDL Cholesterol Calculated (mg/dL (calc))   Date Value   04/19/2019 106 (H)   03/01/2019 192 (H)     LDL Cholesterol Direct (mg/dL)   Date Value   10/20/2023 121   06/24/2022 105       How many servings of fruits and vegetables do you eat daily?  2-3    On average, how many sweetened beverages do you drink each day (Examples: soda, juice, sweet tea, etc.  Do NOT count diet " "or artificially sweetened beverages)?   Maybe 1 sweetened beverage a day, typically only has water most days.     How many days per week do you exercise enough to make your heart beat faster? 4    How many minutes a day do you exercise enough to make your heart beat faster? 30 - 60    How many days per week do you miss taking your medication? 0    Review of Systems  Constitutional, HEENT, cardiovascular, pulmonary, gi and gu systems are negative, except as otherwise noted.      Objective    /82 (BP Location: Right arm, Patient Position: Sitting, Cuff Size: Adult Large)   Pulse 77   Temp 97.8  F (36.6  C) (Temporal)   Ht 1.689 m (5' 6.5\")   Wt 104.8 kg (231 lb)   LMP 09/11/2023   SpO2 97%   BMI 36.73 kg/m    Body mass index is 36.73 kg/m .  Physical Exam   GENERAL: healthy, alert and no distress  EYES: Eyes grossly normal to inspection, PERRL and conjunctivae and sclerae normal  HENT: ear canals and TM's normal, nose and mouth without ulcers or lesions  NECK: no adenopathy, no asymmetry, masses, or scars and thyroid normal to palpation  RESP: lungs clear to auscultation - no rales, rhonchi or wheezes  CV: regular rate and rhythm, normal S1 S2, no S3 or S4, no murmur, click or rub, no peripheral edema  ABDOMEN: soft, nontender, no hepatosplenomegaly, no masses and bowel sounds normal  MS: no gross musculoskeletal defects noted, no edema  SKIN: no suspicious lesions or rashes  NEURO: Normal strength and tone, mentation intact and speech normal  PSYCH: mentation appears normal, affect normal/bright    Labs reviewed below.  Results for orders placed or performed in visit on 10/20/23 (from the past 24 hour(s))   Lipid Panel (BFP)   Result Value Ref Range    Cholesterol 200 (A) 0 - 199 mg/dL    Triglycerides 128 0 - 149 mg/dL    HDL Cholesterol 53 40 - 150 mg/dL    LDL Cholesterol Direct 121 0 - 130 mg/dL    Cholesterol/HDL Ratio 4 0 - 5   Comprehensive Metobolic Panel (BFP)   Result Value Ref Range    Carbon " Dioxide 25.3 20 - 32 mmol/L    Creatinine 0.64 0.60 - 1.30 mg/dL    Glucose 88 60 - 99 mg/dL    Sodium 140.2 135 - 146 mmol/L    Potassium 4.07 3.5 - 5.3 mmol/L    Chloride 107.0 98 - 110 mmol/L    Protein Total 6.9 6.1 - 8.1 g/dL    Albumin 4.2 3.6 - 5.1 g/dL    Alkaline Phosphatase 89 33 - 130 U/L    ALT 25 0 - 32 U/L    AST 19 0 - 35 U/L    Bilirubin Total 0.8 0.2 - 1.2 mg/dL    Urea Nitrogen 12 7 - 25 mg/dL    Calcium 8.9 8.6 - 10.3 mg/dL    BUN/Creatinine Ratio 18.8 6 - 32    Globulin Calculated 2.7 1.9 - 3.7    A/G Ratio 1.6 1 - 2.5

## 2024-02-10 ENCOUNTER — TELEPHONE (OUTPATIENT)
Dept: FAMILY MEDICINE | Facility: CLINIC | Age: 54
End: 2024-02-10

## 2024-02-10 DIAGNOSIS — U07.1 INFECTION DUE TO 2019 NOVEL CORONAVIRUS: Primary | ICD-10-CM

## 2024-02-10 PROCEDURE — 99213 OFFICE O/P EST LOW 20 MIN: CPT | Mod: 95

## 2024-02-10 NOTE — TELEPHONE ENCOUNTER
Klarissa presents via a telehealth visit to discuss testing positive to COVID-19.      Date of the visit: 02/10/24  Consent for visit from patient or patient representative: Yes  Category for office visit-real-time audio with video or audio/telephone only: audio/telephone only  Patient location for the visit: [place]  Provider location for the visit: [place]  Start time and end time for telehealth encounter or total time spent on the visit: 13 minutes     Klarissa called the on call provider line and states the tested positive to COVID yesterday evening (02/09/24). She gave consent to discuss her testing positive and Paxlovid over the phone and gave consent to this virtual visit.      Klarissa states her symptoms of fevers/chills, fatigue, sore throat, post nasal drainage, nasal congestion, and a productive cough started yesterday morning (02/09/24). She denies any symptoms of body aches, ear pain, chest pain, SOB, wheezing, or any GI symptoms. She is eating and drinking normally. This is her first time having COVID. She took Nyquil last night and Dayquil this morning. 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      Assessment/Plan:     1. Infection due to 2019 novel coronavirus  - Covid 19-higher risk due to age and past medical history. We had an extensive discussion with Klarissa about Paxlovid. She does meet the criteria and I believe would benefit from the medication. We discussed common side effects. She will continue all of her daily medications, except she will stop Atorvastatin while taking Paxlovid and resume this medication afterwards. She has normal kidney function, last CMP reviewed on 10/20/23 and she will take Paxlovid three pills twice a day for 5 days, RX sent. Encouraged Klarissa to rest, push fluids, eat healthy and nutritious foods, Tylenol for any fevers, tea with honey for cough, salt water gargles for sore throat, etc. Return to clinic and ER precautions  discussed. She was in understanding and in agreement with the plan above and had no further questions or concerns at this time.  - nirmatrelvir and ritonavir (PAXLOVID) 300 mg/100 mg therapy pack; Take 3 tablets by mouth 2 times daily for 5 days  Dispense: 30 tablet; Refill: 0     Follow up as needed. Return to clinic and ER precautions discussed.      Olga Jara PA-C  [facility]

## 2024-03-12 ENCOUNTER — OFFICE VISIT (OUTPATIENT)
Dept: FAMILY MEDICINE | Facility: CLINIC | Age: 54
End: 2024-03-12

## 2024-03-12 VITALS
TEMPERATURE: 97.6 F | DIASTOLIC BLOOD PRESSURE: 84 MMHG | HEART RATE: 79 BPM | HEIGHT: 67 IN | OXYGEN SATURATION: 97 % | BODY MASS INDEX: 37.67 KG/M2 | SYSTOLIC BLOOD PRESSURE: 132 MMHG | WEIGHT: 240 LBS

## 2024-03-12 DIAGNOSIS — N64.52 BLOODY DISCHARGE FROM RIGHT NIPPLE: Primary | ICD-10-CM

## 2024-03-12 DIAGNOSIS — Z80.3 FAMILY HISTORY OF MALIGNANT NEOPLASM OF BREAST: ICD-10-CM

## 2024-03-12 PROCEDURE — 99213 OFFICE O/P EST LOW 20 MIN: CPT

## 2024-03-12 NOTE — PROGRESS NOTES
Assessment & Plan     1. Bloody discharge from right nipple  - Diagnostic mammogram order placed. Discussed with Klarissa she will likely have a ultrasound of the right breast as well. Will follow up pending the results.   - Radiology Referral  - MA Diagnostic Bilateral w/ Brad; Future    2. Family history of malignant neoplasm of breast  - See above.   - Radiology Referral  - MA Diagnostic Bilateral w/ Brad; Future    Will follow up pending mammogram results. Reasons to follow-up sooner or seek emergent care reviewed.     Olga Jara PA-C  [facility]       Subjective     Klarissa Zimmerman is a 53 year old female who presents to clinic today for the following health issues:    HPI   Chief Complaint   Patient presents with     Breast Exam     Pt has a dry spot on her right breast. 2 weeks she thinks it started and it is right on the nipple. A small dark spot, doesn't itch or bother her all. Her  noticed when he was folding laundry and saw spots of blood in her bra.      Klarissa presents with concerns of bloody nipple discharge from the right breast. Patient believes this began two weeks ago where her  noticed dried spots of blood in her bra when he was folding laundry. Klarissa then noticed a small dark spot on the top part of her right nipple where she noted the blood is coming from. She otherwise denies any pain or itching from the nipple. She also denies any masses or lumps in either breast however does not do regular breast eams. Her last mammogram was in 03/2023 and was normal. She does note she has had abnormal mammograms in the past which have found cysts but no cancer.     There is a family history of breast cancer in her mother who was diagnosed in her 50's. Klarissa has two children and has never been on HRT however was on OCP's with estrogen for a short time in her early 20's. She is still having menstrual cycles although notes they are becoming irregular and more spread  "out. LMP was 03/10/24. She is otherwise not having any chung-menopause symptoms.     Daily medications reviewed.       Objective    /84 (BP Location: Right arm, Patient Position: Sitting, Cuff Size: Adult Large)   Pulse 79   Temp 97.6  F (36.4  C) (Temporal)   Ht 1.689 m (5' 6.5\")   Wt 108.9 kg (240 lb)   SpO2 97%   BMI 38.16 kg/m    Body mass index is 38.16 kg/m .    Physical Examination:  GENERAL: healthy, alert and no distress  EYES: Eyes grossly normal to inspection  BREAST: nipple of right breast reveals a small, circular, dark colored skin lesion on the 12 o'clock position, no visible blood is noted, otherwise both breasts are normal without masses, tenderness, or nipple discharge, along with no palpable axillary masses or adenopathy  RESP: no audible wheezing  MS: no gross musculoskeletal defects noted, no edema  SKIN: no suspicious lesions or rashes  PSYCH: mentation appears normal, affect normal/bright  "

## 2024-03-12 NOTE — NURSING NOTE
Chief Complaint   Patient presents with    Breast Exam     Pt has a dry spot on her right breast. 2 weeks she thinks it started and it is right on the nipple. A small dark spot, doesn't itch or bother her all. Her  noticed when he was folding laundry and saw spots of blood in her bra.    Pre-visit Screening:  Immunizations:  up to date  Colonoscopy:  is up to date  Mammogram: is due  Asthma Action Test/Plan:  na  PHQ9:  na  GAD7:  na  Questioned patient about current smoking habits Pt. has never smoked.  Ok to leave detailed message on voice mail for today's visit only yes, phone # [phone]

## 2024-03-12 NOTE — PATIENT INSTRUCTIONS
Diagnostic mammogram referral placed, they will schedule you for the appropriate ultrasound. I will contact you with the results.

## 2024-03-19 ENCOUNTER — HOSPITAL ENCOUNTER (OUTPATIENT)
Dept: MAMMOGRAPHY | Facility: CLINIC | Age: 54
Discharge: HOME | End: 2024-03-19
Payer: COMMERCIAL

## 2024-03-19 DIAGNOSIS — Z80.3 FAMILY HISTORY OF MALIGNANT NEOPLASM OF BREAST: ICD-10-CM

## 2024-03-19 DIAGNOSIS — N64.52 BLOODY DISCHARGE FROM RIGHT NIPPLE: ICD-10-CM

## 2024-03-19 PROCEDURE — 77062 BREAST TOMOSYNTHESIS BI: CPT

## 2024-03-19 PROCEDURE — 76642 ULTRASOUND BREAST LIMITED: CPT | Mod: RT

## 2024-04-22 NOTE — MR AVS SNAPSHOT
After Visit Summary   11/24/2017    Klarissa Zimmerman    MRN: [id number]           Patient Information     Date Of Birth          1970        Visit Information        Provider Department      11/24/2017 9:00 AM Yoselin Andrews PA-C [facility]        Today's Diagnoses     Encounter for gynecological examination without abnormal finding    -  1    Screening for cervical cancer        Screening for lipid disorders        Screening for metabolic disorder        Class 2 obesity without serious comorbidity with body mass index (BMI) of 36.0 to 36.9 in adult, unspecified obesity type           Follow-ups after your visit        Follow-up notes from your care team     Return in about 1 year (around 11/24/2018) for Physical Exam.      Who to contact     If you have questions or need follow up information about today's clinic visit or your schedule please contact [facility] directly at [phone].  Normal or non-critical lab and imaging results will be communicated to you by Monthlyshart, letter or phone within 4 business days after the clinic has received the results. If you do not hear from us within 7 days, please contact the clinic through Monthlyshart or phone. If you have a critical or abnormal lab result, we will notify you by phone as soon as possible.  Submit refill requests through Netchemia or call your pharmacy and they will forward the refill request to us. Please allow 3 business days for your refill to be completed.          Additional Information About Your Visit        MyChart Information     [employer] gives you secure access to your electronic health record. If you see a primary care provider, you can also send messages to your care team and make appointments. If you have questions, please call your primary care clinic.  If you do not have a primary care provider, please call [phone] and they will assist you.        Care EveryWhere ID     This  "is your Care EveryWhere ID. This could be used by other organizations to access your [place] medical records  [id number]        Your Vitals Were     Pulse Temperature Height Last Period Pulse Oximetry Breastfeeding?    76 97.7  F (36.5  C) (Oral) 1.695 m (5' 6.75\") 11/09/2017 98% No    BMI (Body Mass Index)                   36.55 kg/m2            Blood Pressure from Last 3 Encounters:   11/24/17 128/82   10/17/16 124/82   03/18/16 110/72    Weight from Last 3 Encounters:   11/24/17 105.1 kg (231 lb 9.6 oz)   10/17/16 100.7 kg (222 lb)   03/18/16 95.8 kg (211 lb 4.8 oz)              We Performed the Following     Comprehensive metabolic panel     Lipid Profile ([facility])     ThinPrep Pap and HPV mRna E6/E7 ([facility])     VENOUS COLLECTION        Primary Care Provider Office Phone # Fax #    KAYLA Saenz [phone]       [address] [facility] [address]        Equal Access to Services     [facility]: Hadii luke ku hadasho Soomaali, waaxda luqadaha, qaybta kaalmada adebrianne, norma friedman . So [facility] [phone].    ATENCIÓN: Si habla español, tiene a roth disposición servicios gratuitos de asistencia lingüística. [facility] [phone].    We comply with applicable federal civil rights laws and [place] laws. We do not discriminate on the basis of race, color, national origin, age, disability, sex, sexual orientation, or gender identity.            Thank you!     Thank you for choosing [facility]  for your care. Our goal is always to provide you with excellent care. Hearing back from our patients is one way we can continue to improve our services. Please take a few minutes to complete the written survey that you may receive in the mail after your visit with us. Thank you!             Your Updated Medication List - Protect others around you: Learn how to safely use, store and throw away your medicines at www.disposemymeds.org.    " HPI:  37yo Male PMHx HTN, anemia of chronic disease, ESRD on HD (M/W/F via right AV fistula), left hip arthroplasty in 8/23, history of right leg fracture presented to [facility] from [facility] on 3/6 after falling during a wheelchair transfer landing on his right knee. He is wheelchair bound since his left hip surgery. CT hip with acute garden type 1 minimally valgus impacted transcervical right femoral neck fracture and subacute right pubic body and inferior rami fractures. He is s/p right hip hemiarthroplasty. Post op course complicated by anemia s/p transfusion. Hospital course complicated by hypoglycemia after being shifted for hyperkalemia. Further complicated by febrile 101.6F found to be covid positive and completed remdisivir. He was started on eliquis 2.5mg BID for elevated d-dimers and negative LE duplex.     Patient was evaluated by PM&R and therapy for functional deficits, gait/ADL impairments and acute rehabilitation was recommended. Patient was medically optimized for discharge to [facility]U on 3/27 (27 Mar 2024 15:19)      ROS/subjective:  Patient seen and evaluated in bed  EGD Friday with duodenal ulcer- cauterized  indicates inability to go home to [facility]- cousin now unable to assist  Blood work stable  no dizziness, no headaches, no nausea, no vomiting, no SOB, no chest pain        MEDICATIONS  (STANDING):  acetaminophen     Tablet .. 975 milliGRAM(s) Oral every 8 hours  AQUAPHOR (petrolatum Ointment) 1 Application(s) Topical two times a day  epoetin carito-epbx (RETACRIT) Injectable 72499 Unit(s) IV Push <User Schedule>  lidocaine   4% Patch 1 Patch Transdermal <User Schedule>  metoprolol tartrate 25 milliGRAM(s) Oral two times a day  Nephro-pat 1 Tablet(s) Oral daily  pantoprazole    Tablet 40 milliGRAM(s) Oral two times a day  senna 2 Tablet(s) Oral at bedtime  sevelamer carbonate 1600 milliGRAM(s) Oral three times a day with meals    MEDICATIONS  (PRN):  aluminum hydroxide/magnesium hydroxide/simethicone Suspension 30 milliLiter(s) Oral every 4 hours PRN Dyspepsia  melatonin 6 milliGRAM(s) Oral at bedtime PRN Insomnia  polyethylene glycol 3350 17 Gram(s) Oral daily PRN Constipation                            7.1    4.87  )-----------( 205      ( 22 Apr 2024 12:15 )             22.7     04-22    143  |  103  |  78<H>  ----------------------------<  69<L>  4.5   |  30  |  8.15<H>    Ca    9.2      22 Apr 2024 12:15  Mg     2.4     04-22      Urinalysis Basic - ( 22 Apr 2024 12:15 )    Color: x / Appearance: x / SG: x / pH: x  Gluc: 69 mg/dL / Ketone: x  / Bili: x / Urobili: x   Blood: x / Protein: x / Nitrite: x   Leuk Esterase: x / RBC: x / WBC x   Sq Epi: x / Non Sq Epi: x / Bacteria: x        CAPILLARY BLOOD GLUCOSE          Vital Signs Last 24 Hrs  T(C): 36.6 (22 Apr 2024 08:43), Max: 36.7 (21 Apr 2024 23:01)  T(F): 97.8 (22 Apr 2024 08:43), Max: 98.1 (21 Apr 2024 23:01)  HR: 73 (22 Apr 2024 08:43) (73 - 91)  BP: 123/72 (22 Apr 2024 08:43) (118/67 - 136/76)  BP(mean): --  RR: 17 (22 Apr 2024 08:43) (16 - 17)  SpO2: 100% (22 Apr 2024 08:43) (98% - 100%)    Parameters below as of 22 Apr 2024 08:43  Patient On (Oxygen Delivery Method): room air        Gen - NAD, Comfortable  HEENT - NCAT, EOMI  Neck - Supple, No limited ROM  Pulm - CTAB, No wheeze, No rhonchi, No crackles  Cardiovascular - RRR, S1S2, No murmurs  Chest - good chest expansion, good respiratory effort  Abdomen - Soft, NT/ND, +BS. BM 3/26  Extremities - Right FA AV fistula + bruit and thrill. GIDEON old AV fistula not active, Right Knee with degenerative chnages  Neuro-     Cognitive - awake, alert, oriented to person, place, date, year, and situation.  Able  to follow command     Communication - Fluent, Comprehensible, No dysarthria, No aphasia        Memory:  Recent/ remote memory intact     Cranial Nerves - CN 2-12 intact. No facial asymmetry, Tongue midline, EOMI, Shoulder shrug intact     Motor -                    LEFT    UE - ShAB 5/5, EF 5/5, EE 5/5,  5/5                    RIGHT UE - ShAB 5/5, EF 5/5, EE 5/5,   5/5                    LEFT    LE - HF 4/5, KE 3+/5, DF 4/5, PF 4/5                    RIGHT LE - HF 3+/5, KE 3/5, DF 4/5, PF 4/5   moves Right knee better     Sensory - Intact bilaterally      Tone - normal  MSK: b/l hip discomfort with movement OA changes Right Knee- minimal pain  Psychiatric - Mood stable, Affect WNL  Skin: right hip surgical site with steri strips CDI. b/l sacrum/coccyx DTI improving    IDT Camilla 4/22  DC to [facility]        Continue comprehensive acute rehab program consisting of 3hrs/day of OT/PT.       This list is accurate as of: 11/24/17 12:44 PM.  Always use your most recent med list.                   Brand Name Dispense Instructions for use Diagnosis    CALCIUM + D PO      QD        Multi-vitamin Tabs tablet   Generic drug:  multivitamin, therapeutic with minerals      1 TABLET DAILY        omega-3 fatty acids 1200 MG capsule      Take 1 capsule by mouth daily.

## 2024-06-20 NOTE — TELEPHONE ENCOUNTER
Klarissa Zimmerman is requesting a refill of:    Refused Prescriptions:                       Disp   Refills    atorvastatin (LIPITOR) 20 MG tablet [Pharm*90 tab*0        Sig: Take 1 tablet (20 mg) by mouth daily  Refused By: MEL GOMEZ  Reason for Refusal: Patient needs appointment    Needs OV for refills  
[FreeTextEntry2] : Right foot

## 2024-10-12 ENCOUNTER — HEALTH MAINTENANCE LETTER (OUTPATIENT)
Age: 54
End: 2024-10-12

## 2024-10-18 ENCOUNTER — OFFICE VISIT (OUTPATIENT)
Dept: FAMILY MEDICINE | Facility: CLINIC | Age: 54
End: 2024-10-18

## 2024-10-18 VITALS
HEIGHT: 67 IN | WEIGHT: 240 LBS | TEMPERATURE: 97.3 F | HEART RATE: 80 BPM | SYSTOLIC BLOOD PRESSURE: 130 MMHG | RESPIRATION RATE: 20 BRPM | BODY MASS INDEX: 37.67 KG/M2 | DIASTOLIC BLOOD PRESSURE: 88 MMHG | OXYGEN SATURATION: 98 %

## 2024-10-18 DIAGNOSIS — R06.83 SNORING: ICD-10-CM

## 2024-10-18 DIAGNOSIS — R03.0 ELEVATED BLOOD PRESSURE READING WITHOUT DIAGNOSIS OF HYPERTENSION: ICD-10-CM

## 2024-10-18 DIAGNOSIS — E66.812 CLASS 2 SEVERE OBESITY DUE TO EXCESS CALORIES WITH SERIOUS COMORBIDITY AND BODY MASS INDEX (BMI) OF 38.0 TO 38.9 IN ADULT (H): ICD-10-CM

## 2024-10-18 DIAGNOSIS — N92.6 IRREGULAR PERIODS: ICD-10-CM

## 2024-10-18 DIAGNOSIS — E78.2 MIXED HYPERLIPIDEMIA: ICD-10-CM

## 2024-10-18 DIAGNOSIS — Z00.00 WELL WOMAN EXAM WITHOUT GYNECOLOGICAL EXAM: Primary | ICD-10-CM

## 2024-10-18 DIAGNOSIS — E66.01 CLASS 2 SEVERE OBESITY DUE TO EXCESS CALORIES WITH SERIOUS COMORBIDITY AND BODY MASS INDEX (BMI) OF 38.0 TO 38.9 IN ADULT (H): ICD-10-CM

## 2024-10-18 LAB
ALBUMIN SERPL-MCNC: 4.7 G/DL (ref 3.6–5.1)
ALP SERPL-CCNC: 92 U/L (ref 33–130)
ALT 1742-6: 36 U/L (ref 0–32)
AST 1920-8: 25 U/L (ref 0–35)
BILIRUB SERPL-MCNC: 0.8 MG/DL (ref 0.2–1.2)
BUN SERPL-MCNC: 16 MG/DL (ref 7–25)
BUN/CREATININE RATIO: 21 (ref 6–32)
CALCIUM SERPL-MCNC: 9.5 MG/DL (ref 8.6–10.3)
CHLORIDE SERPLBLD-SCNC: 106.6 MMOL/L (ref 98–110)
CHOLEST SERPL-MCNC: 191 MG/DL (ref 0–199)
CHOLEST/HDLC SERPL: 3 {RATIO} (ref 0–5)
CO2 SERPL-SCNC: 28.9 MMOL/L (ref 20–32)
CREAT SERPL-MCNC: 0.75 MG/DL (ref 0.6–1.3)
GLUCOSE SERPL-MCNC: 84 MG/DL (ref 60–99)
HDLC SERPL-MCNC: 62 MG/DL (ref 40–150)
LDLC SERPL CALC-MCNC: 113 MG/DL (ref 0–129)
POTASSIUM SERPL-SCNC: 4.16 MMOL/L (ref 3.5–5.3)
PROT SERPL-MCNC: 7.5 G/DL (ref 6.1–8.1)
SODIUM SERPL-SCNC: 144.2 MMOL/L (ref 135–146)
TRIGL SERPL-MCNC: 80 MG/DL (ref 0–149)

## 2024-10-18 PROCEDURE — 36415 COLL VENOUS BLD VENIPUNCTURE: CPT

## 2024-10-18 PROCEDURE — 99396 PREV VISIT EST AGE 40-64: CPT

## 2024-10-18 PROCEDURE — 80053 COMPREHEN METABOLIC PANEL: CPT

## 2024-10-18 PROCEDURE — 83001 ASSAY OF GONADOTROPIN (FSH): CPT | Mod: 90

## 2024-10-18 PROCEDURE — 80061 LIPID PANEL: CPT

## 2024-10-18 RX ORDER — ATORVASTATIN CALCIUM 20 MG/1
20 TABLET, FILM COATED ORAL DAILY
Qty: 90 TABLET | Refills: 3 | Status: SHIPPED | OUTPATIENT
Start: 2024-10-18

## 2024-10-18 NOTE — NURSING NOTE
Klarissa Zimmerman is here for a CPX.    Pre-visit planning  Immunizations -up to date  Colonoscopy -is up to date  Mammogram -is up to date  Asthma test --  PHQ9 -  IVIS 7 -      Questioned patient about current smoking habits.  Pt. has never smoked.  Body mass index is 38.16 kg/m .  PULSE regular  My Chart: active  CLASSIFICATION OF OVERWEIGHT AND OBESITY BY BMI                        Obesity Class           BMI(kg/m2)  Underweight                                    < 18.5  Normal                                         18.5-24.9  Overweight                                     25.0-29.9  OBESITY                     I                  30.0-34.9                             II                 35.0-39.9  EXTREME OBESITY             III                >40                            Patient's  BMI Body mass index is 38.16 kg/m .      The patient has verbalized that it is ok to leave a detailed voice message on the patient's cell phone with results/recommendations from this visit.

## 2024-10-18 NOTE — PATIENT INSTRUCTIONS
Thanks for coming in today Klarissa.     I will send you a my chart with your lab results.     Sleep study referral has been placed, someone should contact you to get scheduled.     Please check your blood pressure 2-3 times a week at home. The goal is for your blood pressure to be <130/80, please follow up if it is elevated.     Let me know if you have any questions or concerns.

## 2024-10-18 NOTE — PROGRESS NOTES
Preventive Care Visit  [facility], PMARILU Jara PA-C, Family Medicine  Oct 18, 2024       SUBJECTIVE:   Klarissa is a 54 year old, presenting for the following:  Physical      HPI    Klarissa presents for her yearly physical.     Daily medications: Reviewed.     Hyperlipidemia: Currently takes Atorvastatin 20 mg daily every evening, denies any side effects. Last lipid panel checked 10/2023 and was WNL.     Concerns: None.     Past medical history and daily medications reviewed. Reviewed past medical history, surgical history, family history, and social history below.     Social history:  -Diet: Tries to eat healthy overall, primarily has chicken and fish for protein, good amount of fruits and vegetables, yogurt and cheese for calcium, tries to limit her sweets intake.   -Water: Drinks a good amount of water throughout the day, also will have a latte once in a while.   -Exercise: Active, goes to [employer] three times a week; biking, stretching, etc, also takes dogs on regular talks, is on her feet during the day with teaching.   -Sleep: Wakes up frequently throughout the night,  says she snores a lot, has never done a sleep study, does feel well rested most mornings.   -Daily vitamins: Multivitamin, vitamin D3, and calcium.   -Dentist: Twice a year.   -Eye doctor: Every couple of years.   -Smoking: None.   -Alcohol: Socially.   -LMP: Sometime first week of September, wondering if she is perimenopausal as she now gets a period about once every month and a half that they last 3-4 days and are more heavy, also notes she gets hot flashes during the day, mom reached menopause around age 55.     Screenings:  -Immunizations: Up to date.   -Lab work: CMP, lipid, FSH level.   -Pap smear: Up to date, due 07/2027.   -Mammogram: Up to date, due 03/2025.   -Colonoscopy: Up to date, due 08/2031.     Social History     Tobacco Use    Smoking status: Never     Passive exposure: Never     Smokeless tobacco: Never   Substance Use Topics    Alcohol use: Yes     Alcohol/week: 2.0 standard drinks of alcohol     Types: 1 Standard drinks or equivalent, 1 Standard drinks or equivalent per week     Comment: once a week     Reviewed orders with patient.  Reviewed health maintenance and updated orders accordingly - Yes    Lab work is in process.     Breast Cancer Screening: Any new diagnosis of family breast, ovarian, or bowel cancer? No     FHS-7:       3/19/2024     1:45 PM   Breast CA Risk Assessment (FHS-7)   Did any of your first-degree relatives have breast or ovarian cancer? Yes   Did any of your relatives have bilateral breast cancer? No   Did any man in your family have breast cancer? No   Did any woman in your family have breast and ovarian cancer? Yes   Did any woman in your family have breast cancer before age 50 y? No   Do you have 2 or more relatives with breast and/or ovarian cancer? No   Do you have 2 or more relatives with breast and/or bowel cancer? No     Notes her sister did genetic testing for breast cancer and was negative for the BRCA genes, Klarissa herself has not had genetic testing and is not interested at this time.     Mammogram Screening - Mammogram every 1-2 years updated in Health Maintenance based on mutual decision making. Pertinent mammograms are reviewed under the imaging tab.    History of abnormal Pap smear: No - age 30- 64 PAP with HPV every 5 years recommended.     Reviewed and updated as needed this visit by clinical staff   Tobacco  Allergies  Meds   Med Hx  Surg Hx  Fam Hx  Soc Hx      Reviewed and updated as needed this visit by Provider   Tobacco  Allergies  Meds   Med Hx  Surg Hx  Fam Hx  Soc Hx Sexual   Activity        Review of Systems  CONSTITUTIONAL: NEGATIVE for fever, chills, change in weight  INTEGUMENTARY/SKIN: NEGATIVE for worrisome rashes, moles or lesions  EYES: NEGATIVE for vision changes or irritation  ENT/MOUTH: NEGATIVE for ear, mouth and  "throat problems  RESP: NEGATIVE for significant cough or SOB  BREAST: NEGATIVE for masses, tenderness or discharge  CV: NEGATIVE for chest pain, palpitations or peripheral edema  GI: NEGATIVE for nausea, abdominal pain, heartburn, or change in bowel habits  : NEGATIVE for frequency, dysuria, or hematuria  MUSCULOSKELETAL: NEGATIVE for significant arthralgias or myalgia  NEURO: NEGATIVE for weakness, dizziness or paresthesias  ENDOCRINE: NEGATIVE for temperature intolerance, skin/hair changes  HEME: NEGATIVE for bleeding problems  PSYCHIATRIC: NEGATIVE for changes in mood or affect    OBJECTIVE:   /88 (BP Location: Right arm, Patient Position: Chair, Cuff Size: Adult Small)   Pulse 80   Temp 97.3  F (36.3  C) (Temporal)   Resp 20   Ht 1.689 m (5' 6.5\")   Wt 108.9 kg (240 lb)   LMP 09/02/2024 (Approximate)   SpO2 98%   BMI 38.16 kg/m     Estimated body mass index is 38.16 kg/m  as calculated from the following:    Height as of this encounter: 1.689 m (5' 6.5\").    Weight as of this encounter: 108.9 kg (240 lb).    Physical Exam  GENERAL: alert and no distress  EYES: Eyes grossly normal to inspection, PERRL and conjunctivae and sclerae normal  HENT: ear canals and TM's normal, nose and mouth without ulcers or lesions  NECK: no adenopathy, no asymmetry, masses, or scars  RESP: lungs clear to auscultation - no rales, rhonchi or wheezes  BREAST: normal without masses, tenderness or nipple discharge and no palpable axillary masses or adenopathy  CV: regular rate and rhythm, normal S1 S2, no S3 or S4, no murmur, click or rub, no peripheral edema  ABDOMEN: soft, nontender, no hepatosplenomegaly, no masses and bowel sounds normal   (female) w/bimanual: declines, no concerns  MS: no gross musculoskeletal defects noted, no edema  SKIN: no suspicious lesions or rashes  NEURO: Normal strength and tone, mentation intact and speech normal  PSYCH: mentation appears normal, affect normal/bright    Lab work pending. " "    ASSESSMENT/PLAN:     Well woman exam without gynecological exam  - Klarissa is overall doing well today. Encouraged healthy eating habits, daily exercise, reviewed screenings and immunizations, etc.     Mixed hyperlipidemia  - Labs pending, patient will be notified of results. Refills for Atorvastatin 20 mg daily sent for one year, will make dosage adjustment if necessary.   - VENOUS COLLECTION  - Comprehensive Metobolic Panel ([facility])  - Lipid Panel ([facility])  - atorvastatin (LIPITOR) 20 MG tablet; Take 1 tablet (20 mg) by mouth daily.    Class 2 severe obesity due to excess calories with serious comorbidity and body mass index (BMI) of 38.0 to 38.9 in adult (H)  - Encouraged healthy eating habits and daily exercise. CMP pending.   - VENOUS COLLECTION  - Comprehensive Metobolic Panel ([facility])    Elevated blood pressure reading without diagnosis of hypertension  - Discussed that Klarissa's BP is above goal of being <130/80, recommend she purchase a BP cuff OTC and check her BP 2-3 times a week for next month and if consistently >130/80, schedule a follow up appointment to discuss starting BP medication.     Snoring  - Sleep study referral placed.   - Adult Sleep Eval & Management  Referral - To [facility] Location; Future    Irregular periods  - Discussed irregular periods are likely due to perimenopause, will also check FSH level to see if she is close to reaching menopause.   - FOLLICLE STIMULATING HORMONE ([facility])    Counseling  Reviewed preventive health counseling, as reflected in patient instructions       Regular exercise       Healthy diet/nutrition       Vision screening       Alcohol Use       Osteoporosis prevention/bone health       Colorectal Cancer Screening       (Emma)menopause management    BMI  Estimated body mass index is 38.16 kg/m  as calculated from the following:    Height as of this encounter: 1.689 m (5' 6.5\").    Weight as of this encounter: 108.9 kg (240 lb).     Weight management " plan: Discussed healthy diet and exercise guidelines    She reports that she has never smoked. She has never been exposed to tobacco smoke. She has never used smokeless tobacco.            Signed Electronically by: Olga Jara PA-C

## 2024-10-19 LAB — FSH SERPL-ACNC: 70.3 MIU/ML

## 2025-04-13 ASSESSMENT — SLEEP AND FATIGUE QUESTIONNAIRES
HOW LIKELY ARE YOU TO NOD OFF OR FALL ASLEEP WHILE WATCHING TV: SLIGHT CHANCE OF DOZING
HOW LIKELY ARE YOU TO NOD OFF OR FALL ASLEEP IN A CAR, WHILE STOPPED FOR A FEW MINUTES IN TRAFFIC: SLIGHT CHANCE OF DOZING
HOW LIKELY ARE YOU TO NOD OFF OR FALL ASLEEP WHEN YOU ARE A PASSENGER IN A CAR FOR AN HOUR WITHOUT A BREAK: SLIGHT CHANCE OF DOZING
HOW LIKELY ARE YOU TO NOD OFF OR FALL ASLEEP WHILE SITTING INACTIVE IN A PUBLIC PLACE: WOULD NEVER DOZE
HOW LIKELY ARE YOU TO NOD OFF OR FALL ASLEEP WHILE LYING DOWN TO REST IN THE AFTERNOON WHEN CIRCUMSTANCES PERMIT: SLIGHT CHANCE OF DOZING
HOW LIKELY ARE YOU TO NOD OFF OR FALL ASLEEP WHILE SITTING QUIETLY AFTER LUNCH WITHOUT ALCOHOL: WOULD NEVER DOZE
HOW LIKELY ARE YOU TO NOD OFF OR FALL ASLEEP WHILE SITTING AND TALKING TO SOMEONE: WOULD NEVER DOZE
HOW LIKELY ARE YOU TO NOD OFF OR FALL ASLEEP WHILE SITTING AND READING: SLIGHT CHANCE OF DOZING

## 2025-04-17 ENCOUNTER — OFFICE VISIT (OUTPATIENT)
Dept: SLEEP MEDICINE | Facility: CLINIC | Age: 55
End: 2025-04-17
Attending: PHYSICIAN ASSISTANT
Payer: COMMERCIAL

## 2025-04-17 DIAGNOSIS — R06.83 SNORING: ICD-10-CM

## 2025-04-17 NOTE — PROGRESS NOTES
Pt is completing a home sleep test. Pt was instructed on how to put on the Noxturnal T3 device and associated equipment before going to bed and given the opportunity to practice putting it on before leaving the sleep center. Pt was reminded to bring the home sleep test kit back to the center tomorrow, at the scheduled time for download and reporting. Patient was instructed to complete study using the following treatment?  None  Device number: 29  Jaimie Linton CMA on 4/17/2025 at 2:02 PM

## 2025-04-18 ENCOUNTER — DOCUMENTATION ONLY (OUTPATIENT)
Dept: SLEEP MEDICINE | Facility: CLINIC | Age: 55
End: 2025-04-18
Payer: COMMERCIAL

## 2025-04-21 NOTE — PROGRESS NOTES
Pt returned HST device. It was downloaded and forwarded data to the clinical specialist for scoring.   Jaimie Linton CMA on 4/21/2025 at 8:24 AM

## 2025-05-23 PROBLEM — G47.33 OSA (OBSTRUCTIVE SLEEP APNEA): Status: ACTIVE | Noted: 2025-05-23

## 2025-05-24 ENCOUNTER — HEALTH MAINTENANCE LETTER (OUTPATIENT)
Age: 55
End: 2025-05-24

## 2025-06-16 ENCOUNTER — DOCUMENTATION ONLY (OUTPATIENT)
Dept: SLEEP MEDICINE | Facility: CLINIC | Age: 55
End: 2025-06-16
Payer: COMMERCIAL

## 2025-06-16 DIAGNOSIS — G47.33 OBSTRUCTIVE SLEEP APNEA (ADULT) (PEDIATRIC): Primary | ICD-10-CM

## 2025-06-16 NOTE — PROGRESS NOTES
Patient was offered choice of vendor and chose [facility].  Patient Klarissa Zimmerman was set up at [facility] on June 16, 2025. Patient received a Resmed Airsense 11 Pressures were set at 5-15 cm H2O.   Patient s ramp is 5 cm H2O for Auto and FLEX/EPR is EPR, 2.  Patient received a Resmed Mask name: P30I  Pillow mask size For Her, heated tubing and heated humidifier.  Patient has the following compliance requirements: none  Patient has a follow up on TBD with Bennett Goltz, PA-C.    Sherry Stafford

## 2025-06-19 ENCOUNTER — DOCUMENTATION ONLY (OUTPATIENT)
Dept: SLEEP MEDICINE | Facility: CLINIC | Age: 55
End: 2025-06-19
Payer: COMMERCIAL

## 2025-06-19 NOTE — PROGRESS NOTES
3 day Sleep therapy management telephone visit    Diagnostic AHI: HST: 28        Confirmed with patient at time of call- N/A Patient is still interested in STM service.       Message left for patient to return call.        Objective data     Order Settings for PAP  CPAP min     CPAP max     CPAP fixed         Device settings from machine CPAP min 5.0     CPAP max 15.0      CPAP fixed      EPR Setting TWO    RESMED soft response  OFF     Assessment: Nighty usage most nights over four hours      Patient has the following upcoming sleep appts:      Replacement device: No  STM ordered by provider: Yes     Total time spent on accessing and  interpreting remote patient PAP therapy data  10 minutes    Total time spent counseling, coaching  and reviewing PAP therapy data with patient  1 minutes    [id number] no

## 2025-07-10 ENCOUNTER — DOCUMENTATION ONLY (OUTPATIENT)
Dept: HOME HEALTH SERVICES | Facility: CLINIC | Age: 55
End: 2025-07-10
Payer: COMMERCIAL

## 2025-07-10 NOTE — PROGRESS NOTES
Patient came to [facility] for mask fitting appointment on July 10, 2025. Patient requested to switch masks because oral breathing. Discussed the following masks: F30I, F40, F20, VITERA, DREAMWEAR.  Patient selected a Resmed Mask name: AIRFIT F30I Full Face mask size For Her, Small